# Patient Record
Sex: FEMALE | Race: WHITE | NOT HISPANIC OR LATINO | Employment: UNEMPLOYED | URBAN - METROPOLITAN AREA
[De-identification: names, ages, dates, MRNs, and addresses within clinical notes are randomized per-mention and may not be internally consistent; named-entity substitution may affect disease eponyms.]

---

## 2023-05-16 ENCOUNTER — HOSPITAL ENCOUNTER (EMERGENCY)
Facility: HOSPITAL | Age: 48
Discharge: HOME | End: 2023-05-16
Attending: STUDENT IN AN ORGANIZED HEALTH CARE EDUCATION/TRAINING PROGRAM

## 2023-05-16 VITALS
HEIGHT: 62 IN | OXYGEN SATURATION: 96 % | SYSTOLIC BLOOD PRESSURE: 136 MMHG | BODY MASS INDEX: 20.24 KG/M2 | TEMPERATURE: 98.4 F | HEART RATE: 86 BPM | WEIGHT: 110 LBS | RESPIRATION RATE: 20 BRPM | DIASTOLIC BLOOD PRESSURE: 77 MMHG

## 2023-05-16 DIAGNOSIS — N93.9 VAGINAL BLEEDING: Primary | ICD-10-CM

## 2023-05-16 LAB
ANION GAP SERPL CALCULATED.3IONS-SCNC: 7 MMOL/L (ref 4–13)
BACTERIA UR QL AUTO: ABNORMAL /HPF
BASOPHILS # BLD AUTO: 0.04 THOUSANDS/ÂΜL (ref 0–0.1)
BASOPHILS NFR BLD AUTO: 1 % (ref 0–1)
BILIRUB UR QL STRIP: NEGATIVE
BUN SERPL-MCNC: 19 MG/DL (ref 5–25)
CALCIUM SERPL-MCNC: 9.5 MG/DL (ref 8.4–10.2)
CHLORIDE SERPL-SCNC: 104 MMOL/L (ref 96–108)
CLARITY UR: CLEAR
CO2 SERPL-SCNC: 26 MMOL/L (ref 21–32)
COLOR UR: YELLOW
CREAT SERPL-MCNC: 0.64 MG/DL (ref 0.6–1.3)
EOSINOPHIL # BLD AUTO: 0.13 THOUSAND/ÂΜL (ref 0–0.61)
EOSINOPHIL NFR BLD AUTO: 3 % (ref 0–6)
ERYTHROCYTE [DISTWIDTH] IN BLOOD BY AUTOMATED COUNT: 12.7 % (ref 11.6–15.1)
GFR SERPL CREATININE-BSD FRML MDRD: 105 ML/MIN/1.73SQ M
GLUCOSE SERPL-MCNC: 102 MG/DL (ref 65–140)
GLUCOSE UR STRIP-MCNC: NEGATIVE MG/DL
HCG SERPL QL: NEGATIVE
HCT VFR BLD AUTO: 39.3 % (ref 34.8–46.1)
HGB BLD-MCNC: 12.5 G/DL (ref 11.5–15.4)
HGB UR QL STRIP.AUTO: ABNORMAL
IMM GRANULOCYTES # BLD AUTO: 0.01 THOUSAND/UL (ref 0–0.2)
IMM GRANULOCYTES NFR BLD AUTO: 0 % (ref 0–2)
KETONES UR STRIP-MCNC: NEGATIVE MG/DL
LEUKOCYTE ESTERASE UR QL STRIP: ABNORMAL
LYMPHOCYTES # BLD AUTO: 1.11 THOUSANDS/ÂΜL (ref 0.6–4.47)
LYMPHOCYTES NFR BLD AUTO: 26 % (ref 14–44)
MCH RBC QN AUTO: 30.2 PG (ref 26.8–34.3)
MCHC RBC AUTO-ENTMCNC: 31.8 G/DL (ref 31.4–37.4)
MCV RBC AUTO: 95 FL (ref 82–98)
MONOCYTES # BLD AUTO: 0.54 THOUSAND/ÂΜL (ref 0.17–1.22)
MONOCYTES NFR BLD AUTO: 13 % (ref 4–12)
NEUTROPHILS # BLD AUTO: 2.42 THOUSANDS/ÂΜL (ref 1.85–7.62)
NEUTS SEG NFR BLD AUTO: 57 % (ref 43–75)
NITRITE UR QL STRIP: NEGATIVE
NON-SQ EPI CELLS URNS QL MICRO: ABNORMAL /HPF
NRBC BLD AUTO-RTO: 0 /100 WBCS
PH UR STRIP.AUTO: 5.5 [PH]
PLATELET # BLD AUTO: 248 THOUSANDS/UL (ref 149–390)
PMV BLD AUTO: 11.1 FL (ref 8.9–12.7)
POTASSIUM SERPL-SCNC: 3.5 MMOL/L (ref 3.5–5.3)
PROT UR STRIP-MCNC: NEGATIVE MG/DL
RBC # BLD AUTO: 4.14 MILLION/UL (ref 3.81–5.12)
RBC #/AREA URNS AUTO: ABNORMAL /HPF
SODIUM SERPL-SCNC: 137 MMOL/L (ref 135–147)
SP GR UR STRIP.AUTO: 1.02
TSH SERPL DL<=0.05 MIU/L-ACNC: 1.29 UIU/ML (ref 0.45–4.5)
UROBILINOGEN UR QL STRIP.AUTO: 0.2 E.U./DL
WBC # BLD AUTO: 4.25 THOUSAND/UL (ref 4.31–10.16)
WBC #/AREA URNS AUTO: ABNORMAL /HPF

## 2023-05-16 NOTE — ED PROVIDER NOTES
History  Chief Complaint   Patient presents with   • Vaginal Bleeding     X 2 weeks with back pain     HPI: Is a pleasant 42-year-old female who presents to the emergency department with approximately 2 weeks of vaginal bleeding  Patient states she is currently on her menstrual cycle which normally last 4 days and is regular however the cycle has lasted approximately 16 days of bleeding which is not normal for her  Also reports that approximately 3 days ago she started to have some pain in her lower back which she attributes to the prolonged menstrual cycle  She denies any previous pertinent history regarding her menstrual cycles however does report that she had transient cyst that have since resolved  Reports she is sexually active does not use any form of contraception as she did have a previous tubal ligation  She states she also has a history of lupus and anemia and is concerned that she may be anemic secondary to the heavy menstrual cycles  She denies taking any OTC medications  Denies any history of STDs  None       Past Medical History:   Diagnosis Date   • Hyperparathyroidism ([facility] [address] )    • Lupus ([facility] )        Past Surgical History:   Procedure Laterality Date   • PARATHYROIDECTOMY         History reviewed  No pertinent family history  I have reviewed and agree with the history as documented  E-Cigarette/Vaping   • E-Cigarette Use Never User      E-Cigarette/Vaping Substances     Social History     Tobacco Use   • Smoking status: Never   • Smokeless tobacco: Never   Vaping Use   • Vaping Use: Never used   Substance Use Topics   • Alcohol use: Not Currently   • Drug use: Never       Review of Systems   Constitutional: Negative for activity change, appetite change, chills, fatigue and fever  HENT: Negative for congestion, dental problem, drooling, ear discharge, ear pain, facial swelling, postnasal drip, rhinorrhea and sinus pain  Eyes: Negative for photophobia, pain, discharge and itching  Respiratory: Negative for apnea, cough, chest tightness and shortness of breath  Cardiovascular: Negative for chest pain and leg swelling  Gastrointestinal: Negative for abdominal distention, abdominal pain, anal bleeding, constipation, diarrhea and nausea  Endocrine: Negative for cold intolerance, heat intolerance and polydipsia  Genitourinary: Positive for vaginal bleeding  Negative for difficulty urinating  Musculoskeletal: Positive for back pain  Negative for arthralgias, gait problem, joint swelling and myalgias  Skin: Negative for color change and pallor  Allergic/Immunologic: Negative for immunocompromised state  Neurological: Negative for dizziness, seizures, facial asymmetry, weakness, light-headedness, numbness and headaches  Psychiatric/Behavioral: Negative for agitation, behavioral problems, confusion, decreased concentration and dysphoric mood  All other systems reviewed and are negative  Physical Exam  Physical Exam  Vitals and nursing note reviewed  Constitutional:       General: She is not in acute distress  Appearance: She is well-developed  HENT:      Head: Normocephalic and atraumatic  Eyes:      Conjunctiva/sclera: Conjunctivae normal       Pupils: Pupils are equal, round, and reactive to light  Cardiovascular:      Rate and Rhythm: Normal rate and regular rhythm  Heart sounds: Normal heart sounds  No murmur heard  No friction rub  Pulmonary:      Effort: Pulmonary effort is normal       Breath sounds: Normal breath sounds  Abdominal:      General: Bowel sounds are normal       Palpations: Abdomen is soft  Musculoskeletal:         General: Normal range of motion  Cervical back: Normal range of motion and neck supple  Skin:     General: Skin is warm  Capillary Refill: Capillary refill takes less than 2 seconds  Neurological:      Mental Status: She is alert and oriented to person, place, and time        Motor: No abnormal muscle tone       Coordination: Coordination normal    Psychiatric:         Behavior: Behavior normal          Thought Content: Thought content normal          Vital Signs  ED Triage Vitals [05/16/23 0938]   Temperature Pulse Respirations Blood Pressure SpO2   98 4 °F (36 9 °C) 86 20 136/77 96 %      Temp Source Heart Rate Source Patient Position - Orthostatic VS BP Location FiO2 (%)   Temporal Monitor Sitting Left arm --      Pain Score       3           Vitals:    05/16/23 0938   BP: 136/77   Pulse: 86   Patient Position - Orthostatic VS: Sitting         Visual Acuity      ED Medications  Medications - No data to display    Diagnostic Studies  Results Reviewed     Procedure Component Value Units Date/Time    Urine Microscopic [[id number]]  (Abnormal) Collected: 05/16/23 1011    Lab Status: Final result Specimen: Urine, Clean Catch Updated: 05/16/23 1059     RBC, UA 4-10 /hpf      WBC, UA 10-20 /hpf      Epithelial Cells Occasional /hpf      Bacteria, UA Occasional /hpf     Urine culture [[id number]] Collected: 05/16/23 1011    Lab Status:  In process Specimen: Urine, Clean Catch Updated: 05/16/23 1059    TSH [[id number]]  (Normal) Collected: 05/16/23 1006    Lab Status: Final result Specimen: Blood from Arm, Left Updated: 05/16/23 1048     TSH 3RD GENERATON 1 294 uIU/mL     hCG, qualitative pregnancy [[id number]]  (Normal) Collected: 05/16/23 1006    Lab Status: Final result Specimen: Blood from Arm, Left Updated: 05/16/23 1038     Preg, Serum Negative    Basic metabolic panel [[id number]] Collected: 05/16/23 1006    Lab Status: Final result Specimen: Blood from Arm, Left Updated: 05/16/23 1029     Sodium 137 mmol/L      Potassium 3 5 mmol/L      Chloride 104 mmol/L      CO2 26 mmol/L      ANION GAP 7 mmol/L      BUN 19 mg/dL      Creatinine 0 64 mg/dL      Glucose 102 mg/dL      Calcium 9 5 mg/dL      eGFR 105 ml/min/1 73sq m     Narrative:      [place] guidelines for Chronic Kidney Disease (CKD): •  Stage 1 with normal or high GFR (GFR > 90 mL/min/1 73 square meters)  •  Stage 2 Mild CKD (GFR = 60-89 mL/min/1 73 square meters)  •  Stage 3A Moderate CKD (GFR = 45-59 mL/min/1 73 square meters)  •  Stage 3B Moderate CKD (GFR = 30-44 mL/min/1 73 square meters)  •  Stage 4 Severe CKD (GFR = 15-29 mL/min/1 73 square meters)  •  Stage 5 End Stage CKD (GFR <15 mL/min/1 73 square meters)  Note: GFR calculation is accurate only with a steady state creatinine    UA w Reflex to Microscopic w Reflex to Culture [[id number]]  (Abnormal) Collected: 05/16/23 1011    Lab Status: Final result Specimen: Urine, Clean Catch Updated: 05/16/23 1021     Color, UA Yellow     Clarity, UA Clear     Specific Gravity, UA 1 025     pH, UA 5 5     Leukocytes, UA 1+     Nitrite, UA Negative     Protein, UA Negative mg/dl      Glucose, UA Negative mg/dl      Ketones, UA Negative mg/dl      Urobilinogen, UA 0 2 E U /dl      Bilirubin, UA Negative     Occult Blood, UA Trace-Intact    CBC and differential [[id number]]  (Abnormal) Collected: 05/16/23 1006    Lab Status: Final result Specimen: Blood from Arm, Left Updated: 05/16/23 1012     WBC 4 25 Thousand/uL      RBC 4 14 Million/uL      Hemoglobin 12 5 g/dL      Hematocrit 39 3 %      MCV 95 fL      MCH 30 2 pg      MCHC 31 8 g/dL      RDW 12 7 %      MPV 11 1 fL      Platelets 391 Thousands/uL      nRBC 0 /100 WBCs      Neutrophils Relative 57 %      Immat GRANS % 0 %      Lymphocytes Relative 26 %      Monocytes Relative 13 %      Eosinophils Relative 3 %      Basophils Relative 1 %      Neutrophils Absolute 2 42 Thousands/µL      Immature Grans Absolute 0 01 Thousand/uL      Lymphocytes Absolute 1 11 Thousands/µL      Monocytes Absolute 0 54 Thousand/µL      Eosinophils Absolute 0 13 Thousand/µL      Basophils Absolute 0 04 Thousands/µL                  No orders to display              Procedures  Procedures         ED Course  ED Course as of 05/16/23 [address] [place] [address] May 16, 2023   1004 Exam performed chaperone present Lydia DE LEÓN present  Normal-appearing pelvic examination physiological discharge from the cervix however no obvious laceration abrasion or other abnormal findings  Cervical os was dilated corresponding with history of pregnancy  1014 WBC(!): 4 25   1014 Hemoglobin: 12 5   1023 Blood, UA(!): Trace-Intact   1023 Leukocytes, UA(!): 1+                               SBIRT 22yo+    Flowsheet Row Most Recent Value   Initial Alcohol Screen: US AUDIT-C     1  How often do you have a drink containing alcohol? 0 Filed at: 05/16/2023 1010   2  How many drinks containing alcohol do you have on a typical day you are drinking? 0 Filed at: 05/16/2023 1010   3a  Male UNDER 65: How often do you have five or more drinks on one occasion? 0 Filed at: 05/16/2023 1010   3b  FEMALE Any Age, or MALE 65+: How often do you have 4 or more drinks on one occassion? 0 Filed at: 05/16/2023 1010   Audit-C Score 0 Filed at: 05/16/2023 1010   REGULO: How many times in the past year have you    Used an illegal drug or used a prescription medication for non-medical reasons? Never Filed at: 05/16/2023 1010                    Medical Decision Making  Pleasant 44-year-old female presents with prolonged vaginal bleeding in the setting of her menstrual cycle  Physical exam she is hemodynamically stable and overall well-appearing  We will proceed with basic lab work to rule out anemia, pregnancy, underlying infection  Depending on work-up patient may benefit from ultrasound  Discussed with patient and she consented to pelvic examination  Anticipate discharge home with OB follow-up  Amount and/or Complexity of Data Reviewed  Independent Historian: parent  External Data Reviewed: labs, radiology, ECG and notes  Labs: ordered  Decision-making details documented in ED Course  Radiology: independent interpretation performed  Decision-making details documented in ED Course    ECG/medicine tests: independent interpretation performed  Decision-making details documented in ED Course  Disposition  Final diagnoses:   Vaginal bleeding     Time reflects when diagnosis was documented in both MDM as applicable and the Disposition within this note     Time User Action Codes Description Comment    5/16/2023 10:32 AM Toma Troncoso Add [N93 9] Vaginal bleeding       ED Disposition     ED Disposition   Discharge    Condition   Stable    Date/Time   Tue May 16, 2023 11:02 AM    Comment   Ashley Cardona discharge to home/self care  Follow-up Information     Follow up With Specialties Details Why Contact Info Additional Information    [address] [place] [address] [facility]   [address] [address] [address], [address],           Patient's Medications    No medications on file       No discharge procedures on file      PDMP Review     None          ED Provider  Electronically Signed by           Jason Winn MD  05/16/23 5132

## 2023-05-17 LAB — BACTERIA UR CULT: NORMAL

## 2023-06-07 ENCOUNTER — APPOINTMENT (OUTPATIENT)
Dept: RADIOLOGY | Facility: CLINIC | Age: 48
End: 2023-06-07
Payer: COMMERCIAL

## 2023-06-07 ENCOUNTER — OFFICE VISIT (OUTPATIENT)
Dept: FAMILY MEDICINE CLINIC | Facility: CLINIC | Age: 48
End: 2023-06-07
Payer: COMMERCIAL

## 2023-06-07 ENCOUNTER — APPOINTMENT (OUTPATIENT)
Dept: LAB | Facility: CLINIC | Age: 48
End: 2023-06-07
Payer: COMMERCIAL

## 2023-06-07 VITALS
HEIGHT: 62 IN | DIASTOLIC BLOOD PRESSURE: 78 MMHG | TEMPERATURE: 98.3 F | OXYGEN SATURATION: 98 % | HEART RATE: 75 BPM | WEIGHT: 115 LBS | BODY MASS INDEX: 21.16 KG/M2 | SYSTOLIC BLOOD PRESSURE: 112 MMHG

## 2023-06-07 DIAGNOSIS — K31.A0 GASTRIC INTESTINAL METAPLASIA: Primary | ICD-10-CM

## 2023-06-07 DIAGNOSIS — M54.2 NECK PAIN ON LEFT SIDE: ICD-10-CM

## 2023-06-07 DIAGNOSIS — Z13.220 SCREENING FOR CHOLESTEROL LEVEL: ICD-10-CM

## 2023-06-07 DIAGNOSIS — Z12.11 SCREENING FOR COLON CANCER: ICD-10-CM

## 2023-06-07 DIAGNOSIS — Z12.4 SCREENING FOR CERVICAL CANCER: ICD-10-CM

## 2023-06-07 DIAGNOSIS — R29.898 DECREASED ROM OF NECK: ICD-10-CM

## 2023-06-07 DIAGNOSIS — M32.9 LUPUS (HCC): ICD-10-CM

## 2023-06-07 DIAGNOSIS — Z12.31 ENCOUNTER FOR SCREENING MAMMOGRAM FOR MALIGNANT NEOPLASM OF BREAST: ICD-10-CM

## 2023-06-07 DIAGNOSIS — Z86.010 HISTORY OF COLON POLYPS: ICD-10-CM

## 2023-06-07 PROBLEM — E21.3 HYPERPARATHYROIDISM (HCC): Status: ACTIVE | Noted: 2019-09-27

## 2023-06-07 PROBLEM — IMO0002 LUPUS: Status: ACTIVE | Noted: 2019-09-18

## 2023-06-07 PROBLEM — K64.9 BLEEDING HEMORRHOID: Status: ACTIVE | Noted: 2021-05-19

## 2023-06-07 PROBLEM — E21.3 HYPERPARATHYROIDISM (HCC): Status: RESOLVED | Noted: 2019-09-27 | Resolved: 2023-06-07

## 2023-06-07 PROBLEM — D50.9 IRON DEFICIENCY ANEMIA: Status: ACTIVE | Noted: 2018-08-29

## 2023-06-07 LAB
CHOLEST SERPL-MCNC: 187 MG/DL
HDLC SERPL-MCNC: 78 MG/DL
LDLC SERPL CALC-MCNC: 97 MG/DL (ref 0–100)
NONHDLC SERPL-MCNC: 109 MG/DL
TRIGL SERPL-MCNC: 58 MG/DL

## 2023-06-07 PROCEDURE — 80061 LIPID PANEL: CPT

## 2023-06-07 PROCEDURE — 72050 X-RAY EXAM NECK SPINE 4/5VWS: CPT

## 2023-06-07 PROCEDURE — 99204 OFFICE O/P NEW MOD 45 MIN: CPT | Performed by: NURSE PRACTITIONER

## 2023-06-07 PROCEDURE — 36415 COLL VENOUS BLD VENIPUNCTURE: CPT

## 2023-06-07 NOTE — PROGRESS NOTES
[facility]        NAME: William Murillo is a 50 y o  female  : 1975    MRN: [id number]  DATE: 2023  TIME: 10:09 AM    Assessment and Plan   Gastric intestinal metaplasia [K31  A0]  1  Gastric intestinal metaplasia  Ambulatory Referral to Gastroenterology      2  Screening for colon cancer        3  Encounter for screening mammogram for malignant neoplasm of breast  Mammo screening bilateral w 3d & cad      4  Screening for cervical cancer  Ambulatory Referral to Obstetrics / Gynecology      5  History of colon polyps  Ambulatory Referral to Gastroenterology      6  Neck pain on left side  XR spine cervical complete 4 or 5 vw non injury    Ambulatory Referral to Physical Therapy      7  Decreased ROM of neck  XR spine cervical complete 4 or 5 vw non injury    Ambulatory Referral to Physical Therapy      8  Screening for cholesterol level  Lipid panel    Lipid panel        1  Screening for colon cancer      2  Encounter for screening mammogram for malignant neoplasm of breast    - Mammo screening bilateral w 3d & cad; Future    3  Screening for cervical cancer    - Ambulatory Referral to Obstetrics / Gynecology; Future    4  History of colon polyps  Family history of colon cancer in paternal uncles  And has had polyps with colonoscopy in the past   - Ambulatory Referral to Gastroenterology; Future    5  Gastric intestinal metaplasia  Has had EGD in the past  Food will get stuck if eating too fast    - Ambulatory Referral to Gastroenterology; Future    6  Neck pain on left side    - XR spine cervical complete 4 or 5 vw non injury; Future  - Ambulatory Referral to Physical Therapy; Future    7  Decreased ROM of neck    - XR spine cervical complete 4 or 5 vw non injury; Future  - Ambulatory Referral to Physical Therapy; Future    8  Screening for cholesterol level    - Lipid panel;  Future  - Lipid panel      Patient Instructions     There are no Patient Instructions on file for this visit         Chief Complaint     Chief Complaint   Patient presents with   • Establish Care     Patient would like to talk about an evaluation for Parkinson's  • Annual Exam         History of Present Illness        Patient here for new patient visit to establish care  Moved from [place] 2 years ago  Lupus- hydroxychloroquine for awhile but was not helping with symptoms  Will get rash and joint pain when flared up, usually when eating meat and stress  Was strictly vegan for 7 years, will eat some fish and chicken now  Hyperparathyroidism- was having memory issues, had palpitations  Had parathyroidectomy and symptoms have since resolved  Concern for parkinson's disease  Reports that she is very stiff in her neck and weakness  Taste is off, cant take things unless very strong  Reports that everything is vibrating when waking up, feels like she is sitting on a dryer  Reports she has not spoken to family in over 21 years  Reports when she is walking her left arm does not swing when walking  Reports that she cant wisk with left hand and having trouble lifting things up with left arm, needs support from the right  Denies any injury or trauma  Does get some pain in left shoulder and neck  Review of Systems   Review of Systems   Constitutional: Negative  Negative for fatigue and fever  Respiratory: Negative  Negative for shortness of breath and wheezing  Skin: Negative  Negative for rash  Neurological: Negative  Negative for dizziness, light-headedness and headaches  Psychiatric/Behavioral: Negative  Negative for sleep disturbance  The patient is not nervous/anxious  PHQ-2/9 Depression Screening    Little interest or pleasure in doing things: 0 - not at all  Feeling down, depressed, or hopeless: 0 - not at all  PHQ-2 Score: 0  PHQ-2 Interpretation: Negative depression screen        Current Medications     No current outpatient medications on file      Current Allergies "    Allergies as of 06/07/2023 - Reviewed 06/07/2023   Allergen Reaction Noted   • Penicillin g Anaphylaxis 09/18/2019   • Penicillins Anaphylaxis 05/16/2023   • Sulfamethoxazole Fever 09/18/2019   • Ciprofloxacin Itching and Anxiety 09/18/2019   • Sulfa antibiotics Rash 05/16/2023            The following portions of the patient's history were reviewed and updated as appropriate: allergies, current medications, past family history, past medical history, past social history, past surgical history and problem list      Past Medical History:   Diagnosis Date   • Hyperparathyroidism ([facility] )    • Lupus ([facility] [address] )        Past Surgical History:   Procedure Laterality Date   • PARATHYROIDECTOMY      2020       Family History   Problem Relation Age of Onset   • Multiple sclerosis Mother          Medications have been verified  Objective   /78   Pulse 75   Temp 98 3 °F (36 8 °C)   Ht 5' 2\" (1 575 m)   Wt 52 2 kg (115 lb)   SpO2 98%   BMI 21 03 kg/m²        Physical Exam     Physical Exam  Vitals and nursing note reviewed  Constitutional:       General: She is not in acute distress  Appearance: Normal appearance  She is well-developed  She is not ill-appearing  HENT:      Head: Normocephalic and atraumatic  Eyes:      General: Lids are normal    Neck:      Comments: Limited rom of neck to the left  Cardiovascular:      Rate and Rhythm: Normal rate and regular rhythm  Heart sounds: Normal heart sounds, S1 normal and S2 normal    Pulmonary:      Effort: Pulmonary effort is normal  No respiratory distress  Breath sounds: Normal breath sounds  No decreased breath sounds  Musculoskeletal:         General: No tenderness or deformity  Left shoulder: No swelling, deformity, effusion, tenderness or bony tenderness  Decreased range of motion  Decreased strength  Cervical back: Torticollis present  No edema or erythema  Muscular tenderness (mild) present   No pain with movement or spinous " process tenderness  Decreased range of motion  Lymphadenopathy:      Cervical: No cervical adenopathy  Skin:     General: Skin is warm  Findings: No erythema or rash  Neurological:      Mental Status: She is alert and oriented to person, place, and time  Psychiatric:         Mood and Affect: Mood normal          Behavior: Behavior normal  Behavior is cooperative  Thought Content:  Thought content normal

## 2023-06-13 ENCOUNTER — OFFICE VISIT (OUTPATIENT)
Dept: GASTROENTEROLOGY | Facility: CLINIC | Age: 48
End: 2023-06-13
Payer: COMMERCIAL

## 2023-06-13 VITALS
DIASTOLIC BLOOD PRESSURE: 68 MMHG | HEART RATE: 68 BPM | WEIGHT: 114 LBS | HEIGHT: 62 IN | BODY MASS INDEX: 20.98 KG/M2 | SYSTOLIC BLOOD PRESSURE: 108 MMHG | OXYGEN SATURATION: 98 % | RESPIRATION RATE: 18 BRPM

## 2023-06-13 DIAGNOSIS — Z87.19 HISTORY OF RECTAL BLEEDING: ICD-10-CM

## 2023-06-13 DIAGNOSIS — Z86.010 PERSONAL HISTORY OF COLONIC POLYPS: ICD-10-CM

## 2023-06-13 DIAGNOSIS — R13.10 DYSPHAGIA, UNSPECIFIED TYPE: ICD-10-CM

## 2023-06-13 DIAGNOSIS — K21.9 GASTROESOPHAGEAL REFLUX DISEASE, UNSPECIFIED WHETHER ESOPHAGITIS PRESENT: Primary | ICD-10-CM

## 2023-06-13 DIAGNOSIS — Z83.79 FAMILY HISTORY OF CELIAC DISEASE: ICD-10-CM

## 2023-06-13 DIAGNOSIS — K31.A0 GASTRIC INTESTINAL METAPLASIA: ICD-10-CM

## 2023-06-13 DIAGNOSIS — Z80.0 FAMILY HISTORY OF COLON CANCER: ICD-10-CM

## 2023-06-13 PROCEDURE — 99244 OFF/OP CNSLTJ NEW/EST MOD 40: CPT | Performed by: FAMILY MEDICINE

## 2023-06-13 RX ORDER — FAMOTIDINE 40 MG/1
40 TABLET, FILM COATED ORAL
Qty: 30 TABLET | Refills: 11 | Status: SHIPPED | OUTPATIENT
Start: 2023-06-13

## 2023-06-13 NOTE — PROGRESS NOTES
[facility] - Outpatient Consultation  Kathryn Dunne 50 y o  female MRN: [id number]  Encounter: [id number]          ASSESSMENT AND PLAN:      1  Family history of colon cancer  2  Personal history of colonic polyps  3  History of rectal bleeding  Patient with a longstanding history of intermittent rectal bleeding attributed to large known external hemorrhoids and ?rectal prolapse, without recurrence of rectal bleeding in the last 1 5 years  Additionally, reports at least 2 previous colonoscopies both with polyps in addition to an extensive family history of colorectal cancer (father dx <55 y/o and paternal aunt and uncle)  Given the lack of previous documentation and pathology reports from her prior colonoscopies, recommend repeat colonoscopy for surveillance of polyps  Additionally, recommended referral to genetics given her extensive family history of colorectal cancer  Patient is aware to promptly inform provider for recurrence of rectal bleeding  If colonoscopy is unremarkable, would consider a referral to colorectal surgery for management of known hemorrhoids  - Ambulatory Referral to Gastroenterology  - polyethylene glycol (GOLYTELY) 4000 mL solution; Take 4,000 mL by mouth once for 1 dose  Dispense: 4000 mL; Refill: 0  - Colonoscopy; Future  - Ambulatory Referral to Genetics; Future    4  Gastroesophageal reflux disease, unspecified whether esophagitis present  5  Dysphagia, unspecified type  6  Gastric intestinal metaplasia  Patient with a longstanding history of GERD and esophageal stricture requiring dilatation, now experiencing almost daily reflux and progressively worsening dysphagia to solids x2 years  Has previously taking both omeprazole and pantoprazole with gastroparesis-like side effects  Also reports +gastrointestinal metaplasia on previous biopsy       Discussed broad differential for symptoms, particularly peptic stricture, esophagitis/gastritis, PUD, or EoE  Recommended EGD with possible esophageal and gastric biopsies, in addition to her colonoscopy, for further evaluation  Additionally, discussed she may require biopsies in mapping fashion for surveillance of reported gastrointestinal metaplasia  Given previous intolerance to PPIs, precsirbed famotidine 40 mg qHS  Discussed dietary and lifestyle modifications to help prevent reflux      - Ambulatory Referral to Gastroenterology  - famotidine (PEPCID) 40 MG tablet; Take 1 tablet (40 mg total) by mouth daily at bedtime  Dispense: 30 tablet; Refill: 11  - EGD; Future    7  Family history of celiac disease  Patient reports a family history of celiac disease  Given upper GI symptoms, will check celiac serologies for completeness  - Celiac Disease Antibody Profile; Future      Discussed risks of procedure with patient including, but not limited to, bleeding, infection, and perforation; Patient gave verbal understanding and is agreeable to proceed  Discussed and given instructions for EGD and bowel prep as ordered - Golytely/Dulcolax  Follow-up approx 2 weeks after procedures to discuss results  ______________________________________________________________________    HPI: Patient is a 50 y o  female with PMH significant for a history of NANDA and lupus who presents today for consultation regarding a potential EGD and colonscopy  Patient has a history of daily rectal bleeding presumed to be secondary to large known external hemorrhoids and ?rectal prolapse for which she has followed with multiple gastroenterologists, most recently seen by digestive health services in [facility] she has not had an issue with rectal bleeding for the past year and a half  Believes her last colonoscopy to be in 3/2021, for which documentation and pathology unavailable for review    States she has had at least 2 previous colonoscopies both with polyps, but unsure if they were precancerous  Reports an extensive family history of colon cancer including her father (dx <53 y/o) in addition to her paternal aunt and uncle  Also reports a family history of celiac disease, including her paternal uncle  She also reports having had a previous upper endoscopy notable for intestinal metaplasia  Again, documentation and pathology unavailable for review  Denies a family history of gastric or esophageal cancer  Denies excessive NSAID use, EtOH use or tobacco use  She also has a history of dysphagia, requiring 1 previous dilatation (6674-1173), reports progressively worsening dysphagia to solids and occasionally to liquids over the last 2 years  Also reports almost daily reflux  States she has taken both omeprazole and pantoprazole prior, but gets a sensation of gastroparesis with these medications  REVIEW OF SYSTEMS:    CONSTITUTIONAL: Denies any fever, chills, rigors, and weight loss  HEENT: No earache or tinnitus  Denies hearing loss or visual disturbances  CARDIOVASCULAR: No chest pain or palpitations  RESPIRATORY: Denies any cough, hemoptysis, shortness of breath or dyspnea on exertion  GASTROINTESTINAL: As noted in the History of Present Illness  GENITOURINARY: No problems with urination  Denies any hematuria or dysuria  NEUROLOGIC: No dizziness or vertigo, denies headaches  MUSCULOSKELETAL: Denies any muscle or joint pain  SKIN: Denies skin rashes or itching  ENDOCRINE: Denies excessive thirst  Denies intolerance to heat or cold  PSYCHOSOCIAL: Denies depression or anxiety  Denies any recent memory loss         Historical Information   Past Medical History:   Diagnosis Date   • [facility]    • Lupus ([facility] )      Past Surgical History:   Procedure Laterality Date   • PARATHYROIDECTOMY      2020     Social History   Social History     Substance and Sexual Activity   Alcohol Use Not Currently     Social History     Substance and Sexual Activity   Drug Use Never "    Social History     Tobacco Use   Smoking Status Never   Smokeless Tobacco Never     Family History   Problem Relation Age of Onset   • Multiple sclerosis Mother        Meds/Allergies       Current Outpatient Medications:   •  famotidine (PEPCID) 40 MG tablet  •  polyethylene glycol (GOLYTELY) 4000 mL solution    Allergies   Allergen Reactions   • Penicillin G Anaphylaxis   • Penicillins Anaphylaxis   • Sulfamethoxazole Fever     itching   • Ciprofloxacin Itching and Anxiety   • Sulfa Antibiotics Rash           Objective     Blood pressure 108/68, pulse 68, resp  rate 18, height 5' 2\" (1 575 m), weight 51 7 kg (114 lb), SpO2 98 %  Body mass index is 20 85 kg/m²  PHYSICAL EXAM:      General Appearance:   Alert, cooperative, no distress   HEENT:   Normocephalic, atraumatic, anicteric  Neck:  Supple, symmetrical, trachea midline   Lungs:   Clear to auscultation bilaterally; no rales, rhonchi or wheezing; respirations unlabored    Heart[de-identified]   Regular rate and rhythm; no murmur, rub, or gallop  Abdomen:   Soft, non-tender, non-distended; normal bowel sounds; no masses, no organomegaly    Genitalia:   Deferred    Rectal:   Deferred    Extremities:  No cyanosis, clubbing or edema    Pulses:  2+ and symmetric    Skin:  No jaundice, rashes, or lesions    Lymph nodes:  No palpable cervical lymphadenopathy        Lab Results:   No visits with results within 1 Day(s) from this visit  Latest known visit with results is:   Appointment on 06/07/2023   Component Date Value   • Cholesterol 06/07/2023 187    • Triglycerides 06/07/2023 58    • HDL, Direct 06/07/2023 78    • LDL Calculated 06/07/2023 97    • Non-HDL-Chol (CHOL-HDL) 06/07/2023 109          Radiology Results:   XR spine cervical complete 4 or 5 vw non injury    Result Date: 6/11/2023  Narrative: CERVICAL SPINE INDICATION:   M54 2: Cervicalgia R29 898: Other symptoms and signs involving the musculoskeletal system   COMPARISON:  None VIEWS:  XR SPINE " CERVICAL COMPLETE 4 OR 5 VW NON INJURY FINDINGS: No fracture  Normal alignment without subluxation  There are endplate and facet joint degenerative changes throughout the cervical spine most pronounced at C5-6 and C6-7  The neuroforamina are patent  The prevertebral soft tissues are within normal limits  The lung apices are clear  Impression: No acute osseous abnormality  Degenerative changes as above  Workstation performed: [id number]       Roberto Carlos SpeakerLISETH     **Please note:  Dictation voice to text software may have been used in the creation of this record  Occasional wrong word or “sound alike” substitutions may have occurred due to the inherent limitations of voice recognition software  Read the chart carefully and recognize, using context, where substitutions have occurred  **

## 2023-06-13 NOTE — H&P (VIEW-ONLY)
[facility] - Outpatient Consultation  Alexandrea Hoff 50 y o  female MRN: [id number]  Encounter: [id number]          ASSESSMENT AND PLAN:      1  Family history of colon cancer  2  Personal history of colonic polyps  3  History of rectal bleeding  Patient with a longstanding history of intermittent rectal bleeding attributed to large known external hemorrhoids and ?rectal prolapse, without recurrence of rectal bleeding in the last 1 5 years  Additionally, reports at least 2 previous colonoscopies both with polyps in addition to an extensive family history of colorectal cancer (father dx <55 y/o and paternal aunt and uncle)  Given the lack of previous documentation and pathology reports from her prior colonoscopies, recommend repeat colonoscopy for surveillance of polyps  Additionally, recommended referral to genetics given her extensive family history of colorectal cancer  Patient is aware to promptly inform provider for recurrence of rectal bleeding  If colonoscopy is unremarkable, would consider a referral to colorectal surgery for management of known hemorrhoids  - Ambulatory Referral to Gastroenterology  - polyethylene glycol (GOLYTELY) 4000 mL solution; Take 4,000 mL by mouth once for 1 dose  Dispense: 4000 mL; Refill: 0  - Colonoscopy; Future  - Ambulatory Referral to Genetics; Future    4  Gastroesophageal reflux disease, unspecified whether esophagitis present  5  Dysphagia, unspecified type  6  Gastric intestinal metaplasia  Patient with a longstanding history of GERD and esophageal stricture requiring dilatation, now experiencing almost daily reflux and progressively worsening dysphagia to solids x2 years  Has previously taking both omeprazole and pantoprazole with gastroparesis-like side effects  Also reports +gastrointestinal metaplasia on previous biopsy       Discussed broad differential for symptoms, particularly peptic stricture, esophagitis/gastritis, PUD, or EoE  Recommended EGD with possible esophageal and gastric biopsies, in addition to her colonoscopy, for further evaluation  Additionally, discussed she may require biopsies in mapping fashion for surveillance of reported gastrointestinal metaplasia  Given previous intolerance to PPIs, precsirbed famotidine 40 mg qHS  Discussed dietary and lifestyle modifications to help prevent reflux      - Ambulatory Referral to Gastroenterology  - famotidine (PEPCID) 40 MG tablet; Take 1 tablet (40 mg total) by mouth daily at bedtime  Dispense: 30 tablet; Refill: 11  - EGD; Future    7  Family history of celiac disease  Patient reports a family history of celiac disease  Given upper GI symptoms, will check celiac serologies for completeness  - Celiac Disease Antibody Profile; Future      Discussed risks of procedure with patient including, but not limited to, bleeding, infection, and perforation; Patient gave verbal understanding and is agreeable to proceed  Discussed and given instructions for EGD and bowel prep as ordered - Golytely/Dulcolax  Follow-up approx 2 weeks after procedures to discuss results  ______________________________________________________________________    HPI: Patient is a 50 y o  female with PMH significant for a history of NANDA and lupus who presents today for consultation regarding a potential EGD and colonscopy  Patient has a history of daily rectal bleeding presumed to be secondary to large known external hemorrhoids and ?rectal prolapse for which she has followed with multiple gastroenterologists, most recently seen by digestive health services in [facility] she has not had an issue with rectal bleeding for the past year and a half  Believes her last colonoscopy to be in 3/2021, for which documentation and pathology unavailable for review    States she has had at least 2 previous colonoscopies both with polyps, but unsure if they were precancerous  Reports an extensive family history of colon cancer including her father (dx <55 y/o) in addition to her paternal aunt and uncle  Also reports a family history of celiac disease, including her paternal uncle  She also reports having had a previous upper endoscopy notable for intestinal metaplasia  Again, documentation and pathology unavailable for review  Denies a family history of gastric or esophageal cancer  Denies excessive NSAID use, EtOH use or tobacco use  She also has a history of dysphagia, requiring 1 previous dilatation ([id number]), reports progressively worsening dysphagia to solids and occasionally to liquids over the last 2 years  Also reports almost daily reflux  States she has taken both omeprazole and pantoprazole prior, but gets a sensation of gastroparesis with these medications  REVIEW OF SYSTEMS:    CONSTITUTIONAL: Denies any fever, chills, rigors, and weight loss  HEENT: No earache or tinnitus  Denies hearing loss or visual disturbances  CARDIOVASCULAR: No chest pain or palpitations  RESPIRATORY: Denies any cough, hemoptysis, shortness of breath or dyspnea on exertion  GASTROINTESTINAL: As noted in the History of Present Illness  GENITOURINARY: No problems with urination  Denies any hematuria or dysuria  NEUROLOGIC: No dizziness or vertigo, denies headaches  MUSCULOSKELETAL: Denies any muscle or joint pain  SKIN: Denies skin rashes or itching  ENDOCRINE: Denies excessive thirst  Denies intolerance to heat or cold  PSYCHOSOCIAL: Denies depression or anxiety  Denies any recent memory loss         Historical Information   Past Medical History:   Diagnosis Date   • [facility]    • Lup[facility] ([facility] )      Past Surgical History:   Procedure Laterality Date   • PARATHYROIDECTOMY      2020     Social History   Social History     Substance and Sexual Activity   Alcohol Use Not Currently     Social History     Substance and Sexual Activity   Drug Use Never "    Social History     Tobacco Use   Smoking Status Never   Smokeless Tobacco Never     Family History   Problem Relation Age of Onset   • Multiple sclerosis Mother        Meds/Allergies       Current Outpatient Medications:   •  famotidine (PEPCID) 40 MG tablet  •  polyethylene glycol (GOLYTELY) 4000 mL solution    Allergies   Allergen Reactions   • Penicillin G Anaphylaxis   • Penicillins Anaphylaxis   • Sulfamethoxazole Fever     itching   • Ciprofloxacin Itching and Anxiety   • Sulfa Antibiotics Rash           Objective     Blood pressure 108/68, pulse 68, resp  rate 18, height 5' 2\" (1 575 m), weight 51 7 kg (114 lb), SpO2 98 %  Body mass index is 20 85 kg/m²  PHYSICAL EXAM:      General Appearance:   Alert, cooperative, no distress   HEENT:   Normocephalic, atraumatic, anicteric  Neck:  Supple, symmetrical, trachea midline   Lungs:   Clear to auscultation bilaterally; no rales, rhonchi or wheezing; respirations unlabored    Heart[de-identified]   Regular rate and rhythm; no murmur, rub, or gallop  Abdomen:   Soft, non-tender, non-distended; normal bowel sounds; no masses, no organomegaly    Genitalia:   Deferred    Rectal:   Deferred    Extremities:  No cyanosis, clubbing or edema    Pulses:  2+ and symmetric    Skin:  No jaundice, rashes, or lesions    Lymph nodes:  No palpable cervical lymphadenopathy        Lab Results:   No visits with results within 1 Day(s) from this visit  Latest known visit with results is:   Appointment on 06/07/2023   Component Date Value   • Cholesterol 06/07/2023 187    • Triglycerides 06/07/2023 58    • HDL, Direct 06/07/2023 78    • LDL Calculated 06/07/2023 97    • Non-HDL-Chol (CHOL-HDL) 06/07/2023 109          Radiology Results:   XR spine cervical complete 4 or 5 vw non injury    Result Date: 6/11/2023  Narrative: CERVICAL SPINE INDICATION:   M54 2: Cervicalgia R29 898: Other symptoms and signs involving the musculoskeletal system   COMPARISON:  None VIEWS:  XR SPINE " CERVICAL COMPLETE 4 OR 5 VW NON INJURY FINDINGS: No fracture  Normal alignment without subluxation  There are endplate and facet joint degenerative changes throughout the cervical spine most pronounced at C5-6 and C6-7  The neuroforamina are patent  The prevertebral soft tissues are within normal limits  The lung apices are clear  Impression: No acute osseous abnormality  Degenerative changes as above  Workstation performed: [id number]       Christopher Lynn PA-C     **Please note:  Dictation voice to text software may have been used in the creation of this record  Occasional wrong word or “sound alike” substitutions may have occurred due to the inherent limitations of voice recognition software  Read the chart carefully and recognize, using context, where substitutions have occurred  **

## 2023-06-14 ENCOUNTER — APPOINTMENT (OUTPATIENT)
Dept: LAB | Facility: CLINIC | Age: 48
End: 2023-06-14
Payer: COMMERCIAL

## 2023-06-14 DIAGNOSIS — Z83.79 FAMILY HISTORY OF CELIAC DISEASE: ICD-10-CM

## 2023-06-14 PROCEDURE — 86364 TISS TRNSGLTMNASE EA IG CLAS: CPT

## 2023-06-14 PROCEDURE — 36415 COLL VENOUS BLD VENIPUNCTURE: CPT

## 2023-06-14 PROCEDURE — 86258 DGP ANTIBODY EACH IG CLASS: CPT

## 2023-06-14 PROCEDURE — 86231 EMA EACH IG CLASS: CPT

## 2023-06-14 PROCEDURE — 82784 ASSAY IGA/IGD/IGG/IGM EACH: CPT

## 2023-06-15 ENCOUNTER — TELEPHONE (OUTPATIENT)
Dept: HEMATOLOGY ONCOLOGY | Facility: CLINIC | Age: 48
End: 2023-06-15

## 2023-06-15 LAB
ENDOMYSIUM IGA SER QL: NEGATIVE
GLIADIN PEPTIDE IGA SER-ACNC: 5 UNITS (ref 0–19)
GLIADIN PEPTIDE IGG SER-ACNC: 2 UNITS (ref 0–19)
IGA SERPL-MCNC: 149 MG/DL (ref 87–352)
TTG IGA SER-ACNC: <2 U/ML (ref 0–3)
TTG IGG SER-ACNC: 11 U/ML (ref 0–5)

## 2023-06-15 NOTE — TELEPHONE ENCOUNTER
I called Willy Joel in response to a referral that was received for patient to establish care with [facility]  Outreach was made to schedule a consultation       I left a voicemail explaining the reason for my call and advised patient to call [facility] at [phone]  The referral has been closed

## 2023-06-16 ENCOUNTER — TELEPHONE (OUTPATIENT)
Dept: HEMATOLOGY ONCOLOGY | Facility: CLINIC | Age: 48
End: 2023-06-16

## 2023-06-16 NOTE — TELEPHONE ENCOUNTER
I spoke with Rosario to schedule their consultation with [facility]  Scheduling Outcome: Patient is scheduled for an appointment on 12/06/2023 at 10:30AM with Cathi Nash     Personal/Family History Related to Appointment:     Personal History of Cancer: Patient reports no personal history of cancer    Family History of Cancer: Patient reports family history of leukemia- [place] grandmother  Colon ca- father, [place] aunt, and 2 different [place] uncles  Is patient of [address] [place], [place] [address]    History of Genetic Testing:  Personal History of Genetic Testing: Patient report no personal history of Genetic Testing  Family History of Genetic Testing: Patient reports that no family members have had Genetic Testing  Progeny:  Is patient able to complete our family history questionnaire on a computer?:  Yes    Patient's preferred e-mail address: [email or web address]

## 2023-06-16 NOTE — TELEPHONE ENCOUNTER
[facility]   Patient Name  (First and Last) Colton Escamilla    Patient Date of Birth 1975   Patient's e-mail address [email or web address]    Appointment date and time 12/06/2023 @10:30AM    Was patient advised that this questionnaire must be completed on a computer?  Yes

## 2023-06-20 ENCOUNTER — EVALUATION (OUTPATIENT)
Dept: PHYSICAL THERAPY | Facility: CLINIC | Age: 48
End: 2023-06-20
Payer: COMMERCIAL

## 2023-06-20 DIAGNOSIS — R29.898 DECREASED ROM OF NECK: ICD-10-CM

## 2023-06-20 DIAGNOSIS — M54.2 NECK PAIN ON LEFT SIDE: Primary | ICD-10-CM

## 2023-06-20 PROCEDURE — 97112 NEUROMUSCULAR REEDUCATION: CPT | Performed by: PHYSICAL THERAPIST

## 2023-06-20 PROCEDURE — 97162 PT EVAL MOD COMPLEX 30 MIN: CPT | Performed by: PHYSICAL THERAPIST

## 2023-06-20 NOTE — PROGRESS NOTES
PT Evaluation     Today's date: 2023  Patient name: Lyudmila Modi  : 1975  MRN: [id number]  Referring provider: NALINI Sweet  Dx:   Encounter Diagnosis     ICD-10-CM    1  Neck pain on left side  M54 2 Ambulatory Referral to Physical Therapy      2  Decreased ROM of neck  R29 898 Ambulatory Referral to Physical Therapy          Start Time: 1430  Stop Time: 1520  Total time in clinic (min): 50 minutes    Assessment  Assessment details:   Lyudmila Modi was seen for an initial PT evaluation today  Patient is a 50 y o  female with diagnosis of neck pain and past medical history significant for lupus, parathyroidectomy  Moderate complexity evaluation  due to number of participation restrictions, functional outcome measure of 52% limitation, and evolving clinical presentation  Findings today show limitation in cervical range of motion, lower cervical mobility, scapular weakness and instability, limited deep neck flexor endurance and pain impacting overall functional mobility including ability to turn head, lift, carry  Skilled PT indicated to treat at this time to address above stated deficits and return patient to PLOF  Impairments: abnormal muscle tone, abnormal or restricted ROM, activity intolerance, impaired physical strength, lacks appropriate home exercise program, pain with function, scapular dyskinesis, poor posture  and poor body mechanics  Functional limitations: lifitng, twisting, carrying, reaching  Goals  STG (6 weeks)  1  Patient will display good seated posture with decreased forward head and retracted shoulders for 2 consecutive sessions with 50% VC    2  Patient will have 0/10 cervical pain at rest    3  Improve cervical rotation by 25% for improved ability to drive  LTG (12 weeks)  1  Improved FOTO score by 10% for improved improved overall function  2  Patient will be able to wake with 0/10 pain for return to PLOF     3  Patient will be independent with home exercise program for continued maintenance post PT DC  Plan  Plan details: Progress note in 4 weeks  Patient would benefit from: skilled physical therapy  Planned modality interventions: unattended electrical stimulation, thermotherapy: hydrocollator packs and cryotherapy  Planned therapy interventions: manual therapy, neuromuscular re-education, therapeutic activities, therapeutic exercise, self care and home exercise program  Frequency: 2x week  Duration in weeks: 12  Plan of Care beginning date: 2023  Plan of Care expiration date: 2023  Treatment plan discussed with: patient and PTA        Subjective Evaluation    History of Present Illness  Date of onset: 2010  Mechanism of injury: Jarad Araya is a 50 y o  female who presents to outpatient Physical Therapy today with complaints of neck pain  States chronic pain in neck that has been progressively worsening  Has had medication on and off throughout the years, but has noticed recently that LUE strength is declining  X-ray FINDINGS:     No fracture      Normal alignment without subluxation      There are endplate and facet joint degenerative changes throughout the cervical spine most pronounced at C5-6 and C6-7      The neuroforamina are patent      The prevertebral soft tissues are within normal limits      The lung apices are clear      IMPRESSION:     No acute osseous abnormality      Degenerative changes as above      Pain  Current pain rating: 3  At best pain rating: 3  At worst pain ratin  Location: L c/s into superior shoulder and clavicle   Quality: dull ache, sharp, throbbing and tight    Social Support    Employment status: working (, , cook)  Hand dominance: right      Diagnostic Tests  X-ray: abnormal  Patient Goals  Patient goals for therapy: decreased pain, increased motion and increased strength  Patient goal: be able to turn head to left        Objective     Concurrent Complaints  Positive for headaches (3x/month)   Negative for dizziness, tinnitus, trouble swallowing, difficulty breathing and visual change    Postural Observations    Additional Postural Observation Details  Upper crossed    Neurological Testing     Sensation   Cervical/Thoracic   Left   Intact: light touch    Right   Intact: light touch    Reflexes   Left   Canas's reflex: negative    Right   Canas's reflex: negative    Active Range of Motion   Cervical/Thoracic Spine       Cervical    Subcranial retraction:   Restriction level: moderate  Flexion: 65 degrees   Extension: 55 degrees      Left lateral flexion: 50 degrees      Right lateral flexion: 45 degrees     with pain  Left rotation: 50 degrees  Right rotation: 75 degrees             Scapular Mobility   Left Shoulder   Scapular Dyskinesis: grade II  Scapular Mobility with Shoulder to 90° FF   Scapular winging: moderate (excessive abduction)    Scapular Mobility beyond 90° FF   Downward rotation: premature and excessive    Joint Play   Joints within functional limits: C1, C2 and C3     Hypomobile: C4, C5, C6, C7 and 1st rib   Mechanical Assessment    Cervical    Seated retraction: repeated movements   Pain location: no change  Seated Extension: repeated movements  Pain location: no change  improved L cervical rotation    Thoracic      Lumbar      Strength/Myotome Testing     Left Shoulder     Planes of Motion   Flexion: 4   Abduction: 4   External rotation at 0°: 4+   Internal rotation at 0°: 4+     Right Shoulder     Planes of Motion   Flexion: 5   Abduction: 5   External rotation at 0°: 5   Internal rotation at 0°: 5     Left Elbow   Flexion: 5  Extension: 5    Right Elbow   Flexion: 5  Extension: 5    Left Wrist/Hand      (2nd hand position)     Trial 1: 50    Trial 2: 45    Trial 3: 45    Comments: Slot 2    Right Wrist/Hand      (2nd hand position)     Trial 1: 35    Trial 2: 30    Trial 3: 30    Tests   Cervical   Positive craniocervical flexion test and neck flexor muscle endurance test   Negative vertical compression, alar ligament test, Sharp-Adrienne test and VBI  Left   Negative Spurling's Test B and cervical flexion-rotation test      Right   Negative Spurling's Test B and cervical flexion-rotation test      Lumbar   Negative vertical compression  General Comments:      Shoulder Comments   Depressed L shoulder with OH flexion>abduction  Restriction of L shoulder IR to L1 and ER to T1- states no pain, but stops  Cervical/Thoracic Comments    FOTO: 48% function, 57% predicted function     Neuro Exam:     Headaches   Patient reports headaches: Yes (3x/month)                Precautions: poor depth perception, 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 eye  Access code: [email or web address]  Progress note: 7/20  POC: 9/20    Manuals 6/20       stretching *       SOR        Cervical traction        C/S PA and side glide mobs *       T/S PA mobs        Scapular PNF        IASTM        Neuro Re-Ed        UBE *       scap retraction *       Shoulder rolls *       Chin tucks *       Supine chin tuck  :05x10       MTP/LTP        SNAG L rot 10x       Ther Ex        UT stretch 3x:30       LS stretch        Scalene stretch        Doorway stretch        C/S AROM        Serratus punch *       Prone row *       Prone I, T's thumbs up and down *       Standing YTI        Lat pull down        LT lift off Standing                                                Ther Activity                        Gait Training                        Modalities

## 2023-06-21 ENCOUNTER — HOSPITAL ENCOUNTER (OUTPATIENT)
Dept: MAMMOGRAPHY | Facility: HOSPITAL | Age: 48
Discharge: HOME | End: 2023-06-21
Payer: COMMERCIAL

## 2023-06-21 VITALS — WEIGHT: 114 LBS | BODY MASS INDEX: 20.98 KG/M2 | HEIGHT: 62 IN

## 2023-06-21 DIAGNOSIS — Z12.31 ENCOUNTER FOR SCREENING MAMMOGRAM FOR MALIGNANT NEOPLASM OF BREAST: ICD-10-CM

## 2023-06-21 PROCEDURE — 77063 BREAST TOMOSYNTHESIS BI: CPT

## 2023-06-21 PROCEDURE — 77067 SCR MAMMO BI INCL CAD: CPT

## 2023-06-27 ENCOUNTER — HOSPITAL ENCOUNTER (OUTPATIENT)
Dept: GASTROENTEROLOGY | Facility: HOSPITAL | Age: 48
Setting detail: OUTPATIENT SURGERY
Discharge: HOME | End: 2023-06-27
Payer: COMMERCIAL

## 2023-06-27 ENCOUNTER — ANESTHESIA EVENT (OUTPATIENT)
Dept: GASTROENTEROLOGY | Facility: HOSPITAL | Age: 48
End: 2023-06-27

## 2023-06-27 ENCOUNTER — ANESTHESIA (OUTPATIENT)
Dept: GASTROENTEROLOGY | Facility: HOSPITAL | Age: 48
End: 2023-06-27

## 2023-06-27 VITALS
OXYGEN SATURATION: 98 % | BODY MASS INDEX: 20.98 KG/M2 | RESPIRATION RATE: 16 BRPM | HEART RATE: 56 BPM | TEMPERATURE: 97.3 F | WEIGHT: 114 LBS | HEIGHT: 62 IN | DIASTOLIC BLOOD PRESSURE: 64 MMHG | SYSTOLIC BLOOD PRESSURE: 111 MMHG

## 2023-06-27 DIAGNOSIS — Z80.0 FAMILY HISTORY OF COLON CANCER: ICD-10-CM

## 2023-06-27 DIAGNOSIS — K21.9 GASTROESOPHAGEAL REFLUX DISEASE, UNSPECIFIED WHETHER ESOPHAGITIS PRESENT: ICD-10-CM

## 2023-06-27 DIAGNOSIS — Z86.010 PERSONAL HISTORY OF COLONIC POLYPS: ICD-10-CM

## 2023-06-27 DIAGNOSIS — K31.A0 GASTRIC INTESTINAL METAPLASIA: ICD-10-CM

## 2023-06-27 DIAGNOSIS — Z87.19 HISTORY OF RECTAL BLEEDING: ICD-10-CM

## 2023-06-27 DIAGNOSIS — R13.10 DYSPHAGIA, UNSPECIFIED TYPE: ICD-10-CM

## 2023-06-27 LAB
EXT PREGNANCY TEST URINE: NEGATIVE
EXT. CONTROL: NORMAL

## 2023-06-27 PROCEDURE — 81025 URINE PREGNANCY TEST: CPT | Performed by: INTERNAL MEDICINE

## 2023-06-27 PROCEDURE — 88313 SPECIAL STAINS GROUP 2: CPT | Performed by: PATHOLOGY

## 2023-06-27 PROCEDURE — 88305 TISSUE EXAM BY PATHOLOGIST: CPT | Performed by: PATHOLOGY

## 2023-06-27 RX ORDER — LIDOCAINE HYDROCHLORIDE 20 MG/ML
INJECTION, SOLUTION EPIDURAL; INFILTRATION; INTRACAUDAL; PERINEURAL AS NEEDED
Status: DISCONTINUED | OUTPATIENT
Start: 2023-06-27 | End: 2023-06-27

## 2023-06-27 RX ORDER — PROPOFOL 10 MG/ML
INJECTION, EMULSION INTRAVENOUS AS NEEDED
Status: DISCONTINUED | OUTPATIENT
Start: 2023-06-27 | End: 2023-06-27

## 2023-06-27 RX ORDER — SODIUM CHLORIDE, SODIUM LACTATE, POTASSIUM CHLORIDE, CALCIUM CHLORIDE 600; 310; 30; 20 MG/100ML; MG/100ML; MG/100ML; MG/100ML
125 INJECTION, SOLUTION INTRAVENOUS CONTINUOUS
Status: DISCONTINUED | OUTPATIENT
Start: 2023-06-27 | End: 2023-07-01 | Stop reason: HOSPADM

## 2023-06-27 RX ADMIN — SODIUM CHLORIDE, SODIUM LACTATE, POTASSIUM CHLORIDE, AND CALCIUM CHLORIDE: .6; .31; .03; .02 INJECTION, SOLUTION INTRAVENOUS at 11:58

## 2023-06-27 RX ADMIN — SODIUM CHLORIDE, SODIUM LACTATE, POTASSIUM CHLORIDE, AND CALCIUM CHLORIDE 125 ML/HR: .6; .31; .03; .02 INJECTION, SOLUTION INTRAVENOUS at 12:01

## 2023-06-27 RX ADMIN — PROPOFOL 150 MG: 10 INJECTION, EMULSION INTRAVENOUS at 12:16

## 2023-06-27 RX ADMIN — PROPOFOL 50 MG: 10 INJECTION, EMULSION INTRAVENOUS at 12:27

## 2023-06-27 RX ADMIN — LIDOCAINE HYDROCHLORIDE 100 MG: 20 INJECTION, SOLUTION EPIDURAL; INFILTRATION; INTRACAUDAL; PERINEURAL at 12:16

## 2023-06-27 RX ADMIN — PROPOFOL 50 MG: 10 INJECTION, EMULSION INTRAVENOUS at 12:20

## 2023-06-27 NOTE — INTERVAL H&P NOTE
H&P reviewed  After examining the patient I find no changes in the patients condition since the H&P had been written      Vitals:    06/27/23 1157   BP: 144/68   Pulse: 65   Resp: 18   Temp: 97 7 °F (36 5 °C)   SpO2: 98%

## 2023-06-27 NOTE — ANESTHESIA POSTPROCEDURE EVALUATION
Post-Op Assessment Note    CV Status:  Stable  Pain Score: 0    Pain management: adequate     Mental Status:  Awake and sleepy   Hydration Status:  Euvolemic   PONV Controlled:  Controlled   Airway Patency:  Patent      Post Op Vitals Reviewed: Yes      Staff: CRNA         No notable events documented      BP   116/63   Temp      Pulse  65   Resp   12   SpO2   99

## 2023-06-27 NOTE — ANESTHESIA PREPROCEDURE EVALUATION
Procedure:  EGD  COLONOSCOPY    Relevant Problems   CARDIO   (+) Bleeding hemorrhoid      GI/HEPATIC   (+) Bleeding hemorrhoid      HEMATOLOGY   (+) Iron deficiency anemia        Physical Exam    Airway    Mallampati score: II  TM Distance: >3 FB  Neck ROM: full     Dental   No notable dental hx     Cardiovascular      Pulmonary      Other Findings        Anesthesia Plan  ASA Score- 2     Anesthesia Type- IV sedation with anesthesia with ASA Monitors  Additional Monitors:   Airway Plan:           Plan Factors-Exercise tolerance (METS): >4 METS  Chart reviewed  Existing labs reviewed  Patient summary reviewed  Patient is not a current smoker  Obstructive sleep apnea risk education given perioperatively  Induction- intravenous  Postoperative Plan-     Informed Consent- Anesthetic plan and risks discussed with patient  I personally reviewed this patient with the CRNA  Discussed and agreed on the Anesthesia Plan with the CRNA  Kaitlynn Urbina

## 2023-06-29 ENCOUNTER — APPOINTMENT (OUTPATIENT)
Dept: PHYSICAL THERAPY | Facility: CLINIC | Age: 48
End: 2023-06-29
Payer: COMMERCIAL

## 2023-06-29 PROCEDURE — 88305 TISSUE EXAM BY PATHOLOGIST: CPT | Performed by: PATHOLOGY

## 2023-06-29 PROCEDURE — 88313 SPECIAL STAINS GROUP 2: CPT | Performed by: PATHOLOGY

## 2023-11-04 ENCOUNTER — HOSPITAL ENCOUNTER (EMERGENCY)
Facility: HOSPITAL | Age: 48
Discharge: HOME | End: 2023-11-04
Attending: EMERGENCY MEDICINE
Payer: COMMERCIAL

## 2023-11-04 ENCOUNTER — APPOINTMENT (EMERGENCY)
Dept: CT IMAGING | Facility: HOSPITAL | Age: 48
End: 2023-11-04
Payer: COMMERCIAL

## 2023-11-04 ENCOUNTER — APPOINTMENT (EMERGENCY)
Dept: ULTRASOUND IMAGING | Facility: HOSPITAL | Age: 48
End: 2023-11-04
Payer: COMMERCIAL

## 2023-11-04 VITALS
SYSTOLIC BLOOD PRESSURE: 113 MMHG | TEMPERATURE: 99.1 F | DIASTOLIC BLOOD PRESSURE: 55 MMHG | OXYGEN SATURATION: 97 % | RESPIRATION RATE: 18 BRPM | HEART RATE: 82 BPM

## 2023-11-04 DIAGNOSIS — N61.0 CELLULITIS OF LEFT BREAST: ICD-10-CM

## 2023-11-04 DIAGNOSIS — N63.0 SWELLING OF BREAST: ICD-10-CM

## 2023-11-04 DIAGNOSIS — R51.9 HEADACHE: ICD-10-CM

## 2023-11-04 DIAGNOSIS — N64.4 BREAST PAIN, LEFT: Primary | ICD-10-CM

## 2023-11-04 LAB
ALBUMIN SERPL BCP-MCNC: 4.5 G/DL (ref 3.5–5)
ALP SERPL-CCNC: 48 U/L (ref 34–104)
ALT SERPL W P-5'-P-CCNC: 11 U/L (ref 7–52)
ANION GAP SERPL CALCULATED.3IONS-SCNC: 9 MMOL/L
APTT PPP: 27 SECONDS (ref 23–37)
AST SERPL W P-5'-P-CCNC: 17 U/L (ref 13–39)
BASOPHILS # BLD MANUAL: 0 THOUSAND/UL (ref 0–0.1)
BASOPHILS NFR MAR MANUAL: 0 % (ref 0–1)
BILIRUB SERPL-MCNC: 1.92 MG/DL (ref 0.2–1)
BUN SERPL-MCNC: 7 MG/DL (ref 5–25)
CALCIUM SERPL-MCNC: 9.8 MG/DL (ref 8.4–10.2)
CHLORIDE SERPL-SCNC: 103 MMOL/L (ref 96–108)
CO2 SERPL-SCNC: 25 MMOL/L (ref 21–32)
CREAT SERPL-MCNC: 0.6 MG/DL (ref 0.6–1.3)
EOSINOPHIL # BLD MANUAL: 0 THOUSAND/UL (ref 0–0.4)
EOSINOPHIL NFR BLD MANUAL: 0 % (ref 0–6)
ERYTHROCYTE [DISTWIDTH] IN BLOOD BY AUTOMATED COUNT: 12.8 % (ref 11.6–15.1)
GFR SERPL CREATININE-BSD FRML MDRD: 108 ML/MIN/1.73SQ M
GLUCOSE SERPL-MCNC: 107 MG/DL (ref 65–140)
HCT VFR BLD AUTO: 42.5 % (ref 34.8–46.1)
HGB BLD-MCNC: 14.1 G/DL (ref 11.5–15.4)
INR PPP: 0.99 (ref 0.84–1.19)
LYMPHOCYTES # BLD AUTO: 1.7 THOUSAND/UL (ref 0.6–4.47)
LYMPHOCYTES # BLD AUTO: 13 % (ref 14–44)
MCH RBC QN AUTO: 31.1 PG (ref 26.8–34.3)
MCHC RBC AUTO-ENTMCNC: 33.2 G/DL (ref 31.4–37.4)
MCV RBC AUTO: 94 FL (ref 82–98)
MONOCYTES # BLD AUTO: 0.91 THOUSAND/UL (ref 0–1.22)
MONOCYTES NFR BLD: 7 % (ref 4–12)
NEUTROPHILS # BLD MANUAL: 10.45 THOUSAND/UL (ref 1.85–7.62)
NEUTS SEG NFR BLD AUTO: 80 % (ref 43–75)
PLATELET # BLD AUTO: 239 THOUSANDS/UL (ref 149–390)
PLATELET BLD QL SMEAR: ADEQUATE
PMV BLD AUTO: 11.3 FL (ref 8.9–12.7)
POTASSIUM SERPL-SCNC: 3.6 MMOL/L (ref 3.5–5.3)
PROCALCITONIN SERPL-MCNC: <0.05 NG/ML
PROT SERPL-MCNC: 7.2 G/DL (ref 6.4–8.4)
PROTHROMBIN TIME: 13.2 SECONDS (ref 11.6–14.5)
RBC # BLD AUTO: 4.53 MILLION/UL (ref 3.81–5.12)
RBC MORPH BLD: NORMAL
SODIUM SERPL-SCNC: 137 MMOL/L (ref 135–147)
WBC # BLD AUTO: 13.06 THOUSAND/UL (ref 4.31–10.16)

## 2023-11-04 PROCEDURE — 96361 HYDRATE IV INFUSION ADD-ON: CPT

## 2023-11-04 PROCEDURE — 85007 BL SMEAR W/DIFF WBC COUNT: CPT | Performed by: EMERGENCY MEDICINE

## 2023-11-04 PROCEDURE — 96374 THER/PROPH/DIAG INJ IV PUSH: CPT

## 2023-11-04 PROCEDURE — 85730 THROMBOPLASTIN TIME PARTIAL: CPT | Performed by: EMERGENCY MEDICINE

## 2023-11-04 PROCEDURE — 99284 EMERGENCY DEPT VISIT MOD MDM: CPT

## 2023-11-04 PROCEDURE — 99285 EMERGENCY DEPT VISIT HI MDM: CPT | Performed by: EMERGENCY MEDICINE

## 2023-11-04 PROCEDURE — 85610 PROTHROMBIN TIME: CPT | Performed by: EMERGENCY MEDICINE

## 2023-11-04 PROCEDURE — 80053 COMPREHEN METABOLIC PANEL: CPT | Performed by: EMERGENCY MEDICINE

## 2023-11-04 PROCEDURE — 96375 TX/PRO/DX INJ NEW DRUG ADDON: CPT

## 2023-11-04 PROCEDURE — 36415 COLL VENOUS BLD VENIPUNCTURE: CPT | Performed by: EMERGENCY MEDICINE

## 2023-11-04 PROCEDURE — G1004 CDSM NDSC: HCPCS

## 2023-11-04 PROCEDURE — 85027 COMPLETE CBC AUTOMATED: CPT | Performed by: EMERGENCY MEDICINE

## 2023-11-04 PROCEDURE — 70498 CT ANGIOGRAPHY NECK: CPT

## 2023-11-04 PROCEDURE — 70496 CT ANGIOGRAPHY HEAD: CPT

## 2023-11-04 PROCEDURE — 76642 ULTRASOUND BREAST LIMITED: CPT

## 2023-11-04 PROCEDURE — 84145 PROCALCITONIN (PCT): CPT | Performed by: EMERGENCY MEDICINE

## 2023-11-04 RX ORDER — DIPHENHYDRAMINE HYDROCHLORIDE 50 MG/ML
25 INJECTION INTRAMUSCULAR; INTRAVENOUS ONCE
Status: COMPLETED | OUTPATIENT
Start: 2023-11-04 | End: 2023-11-04

## 2023-11-04 RX ORDER — DOXYCYCLINE HYCLATE 100 MG/1
100 CAPSULE ORAL 2 TIMES DAILY
Qty: 14 CAPSULE | Refills: 0 | Status: SHIPPED | OUTPATIENT
Start: 2023-11-04 | End: 2023-11-11

## 2023-11-04 RX ORDER — ACETAMINOPHEN 325 MG/1
975 TABLET ORAL ONCE
Status: COMPLETED | OUTPATIENT
Start: 2023-11-04 | End: 2023-11-04

## 2023-11-04 RX ORDER — KETOROLAC TROMETHAMINE 30 MG/ML
15 INJECTION, SOLUTION INTRAMUSCULAR; INTRAVENOUS ONCE
Status: COMPLETED | OUTPATIENT
Start: 2023-11-04 | End: 2023-11-04

## 2023-11-04 RX ORDER — DOXYCYCLINE HYCLATE 100 MG/1
100 CAPSULE ORAL ONCE
Status: COMPLETED | OUTPATIENT
Start: 2023-11-04 | End: 2023-11-04

## 2023-11-04 RX ORDER — METOCLOPRAMIDE HYDROCHLORIDE 5 MG/ML
10 INJECTION INTRAMUSCULAR; INTRAVENOUS ONCE
Status: COMPLETED | OUTPATIENT
Start: 2023-11-04 | End: 2023-11-04

## 2023-11-04 RX ADMIN — METOCLOPRAMIDE 10 MG: 5 INJECTION, SOLUTION INTRAMUSCULAR; INTRAVENOUS at 13:22

## 2023-11-04 RX ADMIN — DIPHENHYDRAMINE HYDROCHLORIDE 25 MG: 50 INJECTION, SOLUTION INTRAMUSCULAR; INTRAVENOUS at 13:21

## 2023-11-04 RX ADMIN — KETOROLAC TROMETHAMINE 15 MG: 30 INJECTION, SOLUTION INTRAMUSCULAR; INTRAVENOUS at 14:34

## 2023-11-04 RX ADMIN — IOHEXOL 75 ML: 350 INJECTION, SOLUTION INTRAVENOUS at 13:51

## 2023-11-04 RX ADMIN — ACETAMINOPHEN 975 MG: 325 TABLET ORAL at 13:12

## 2023-11-04 RX ADMIN — DOXYCYCLINE 100 MG: 100 CAPSULE ORAL at 14:10

## 2023-11-04 RX ADMIN — SODIUM CHLORIDE 1000 ML: 0.9 INJECTION, SOLUTION INTRAVENOUS at 13:21

## 2023-11-04 NOTE — ED PROVIDER NOTES
History  Chief Complaint   Patient presents with    Breast Pain     Patient reports left breast pain and swelling since yesterday. Patient reports hx of complex cysts in this breast. Patient also reporting a headache since last evening. Denies breast feeding or injury to the breast      Patient is a 51-year-old female with a history of lupus, who presents for evaluation of a swollen left breast pain and swelling. Patient says the symptoms have been present since yesterday. Patient says she had a history of complex cysts in her breast but other than that her mammogram 3 months ago was normal.  She admits to subjective fevers. She denies any nipple discharge. She is also complaining of a headache. Patient says that the headache started last night. She says that it is gradually gotten worse since last night. She says it is associated with some neck pain/stiffness. She also admits to some photophobia. She says she does not normally get headaches. She took some Excedrin around 8 AM this morning with little relief. Prior to Admission Medications   Prescriptions Last Dose Informant Patient Reported? Taking?   famotidine (PEPCID) 40 MG tablet   No No   Sig: Take 1 tablet (40 mg total) by mouth daily at bedtime      Facility-Administered Medications: None       Past Medical History:   Diagnosis Date    Hyperparathyroidism ([address])     Lupus ([address])        Past Surgical History:   Procedure Laterality Date    BREAST BIOPSY Left 2019    benign    PARATHYROIDECTOMY      2020       Family History   Problem Relation Age of Onset    Multiple sclerosis Mother     Colon cancer Father     No Known Problems Daughter     No Known Problems Daughter     No Known Problems Maternal Grandmother     No Known Problems Paternal Grandmother     Colon cancer Paternal Aunt     Colon cancer Paternal Aunt      I have reviewed and agree with the history as documented.     E-Cigarette/Vaping    E-Cigarette Use Never User E-Cigarette/Vaping Substances     Social History     Tobacco Use    Smoking status: Never    Smokeless tobacco: Never   Vaping Use    Vaping Use: Never used   Substance Use Topics    Alcohol use: Not Currently    Drug use: Never       Review of Systems   Constitutional:  Negative for chills, diaphoresis, fever and unexpected weight change. HENT:  Negative for congestion, ear pain, sinus pressure, sore throat and trouble swallowing. Eyes:  Negative for pain, discharge, redness and itching. Respiratory:  Negative for cough, chest tightness, shortness of breath and wheezing. Cardiovascular:  Negative for chest pain, palpitations and leg swelling. Gastrointestinal:  Negative for abdominal distention, abdominal pain, blood in stool, diarrhea, nausea and vomiting. Endocrine: Negative for polyphagia and polyuria. Genitourinary:  Negative for difficulty urinating, dysuria, flank pain, hematuria, pelvic pain and vaginal bleeding. Musculoskeletal:  Negative for arthralgias, back pain and myalgias. Left breast pain, swelling, tenderness     Skin:  Negative for rash. Neurological:  Positive for headaches. Negative for dizziness, syncope, weakness and light-headedness. Physical Exam  Physical Exam  Vitals and nursing note reviewed. Constitutional:       General: She is not in acute distress. Appearance: She is well-developed. HENT:      Head: Normocephalic and atraumatic. Right Ear: External ear normal.      Left Ear: External ear normal.      Nose: Nose normal.      Mouth/Throat:      Mouth: Mucous membranes are moist.      Pharynx: No oropharyngeal exudate. Eyes:      Conjunctiva/sclera: Conjunctivae normal.      Pupils: Pupils are equal, round, and reactive to light. Cardiovascular:      Rate and Rhythm: Normal rate and regular rhythm. Heart sounds: Normal heart sounds. No murmur heard. No friction rub. No gallop.    Pulmonary:      Effort: Pulmonary effort is normal. No respiratory distress. Breath sounds: Normal breath sounds. No wheezing or rales. Chest:       Abdominal:      General: There is no distension. Palpations: Abdomen is soft. Tenderness: There is no abdominal tenderness. There is no guarding. Musculoskeletal:         General: No swelling, tenderness or deformity. Normal range of motion. Cervical back: Normal range of motion and neck supple. Lymphadenopathy:      Cervical: No cervical adenopathy. Skin:     General: Skin is warm and dry. Neurological:      General: No focal deficit present. Mental Status: She is alert and oriented to person, place, and time. Mental status is at baseline. Cranial Nerves: No cranial nerve deficit. Sensory: No sensory deficit. Motor: No weakness or abnormal muscle tone.       Coordination: Coordination normal.         Vital Signs  ED Triage Vitals   Temperature Pulse Respirations Blood Pressure SpO2   11/04/23 1254 11/04/23 1254 11/04/23 1254 11/04/23 1254 11/04/23 1254   99.1 °F (37.3 °C) 98 18 130/69 96 %      Temp src Heart Rate Source Patient Position - Orthostatic VS BP Location FiO2 (%)   -- 11/04/23 1514 11/04/23 1514 11/04/23 1514 --    Monitor Sitting Left arm       Pain Score       11/04/23 1254       7           Vitals:    11/04/23 1254 11/04/23 1514   BP: 130/69 113/55   Pulse: 98 82   Patient Position - Orthostatic VS:  Sitting         Visual Acuity  Visual Acuity      Flowsheet Row Most Recent Value   L Pupil Size (mm) 3   R Pupil Size (mm) 3            ED Medications  Medications   sodium chloride 0.9 % bolus 1,000 mL (0 mL Intravenous Stopped 11/4/23 1513)   acetaminophen (TYLENOL) tablet 975 mg (975 mg Oral Given 11/4/23 1312)   metoclopramide (REGLAN) injection 10 mg (10 mg Intravenous Given 11/4/23 1322)   diphenhydrAMINE (BENADRYL) injection 25 mg (25 mg Intravenous Given 11/4/23 1321)   iohexol (OMNIPAQUE) 350 MG/ML injection (MULTI-DOSE) 100 mL (75 mL Intravenous Given 11/4/23 1351)   doxycycline hyclate (VIBRAMYCIN) capsule 100 mg (100 mg Oral Given 11/4/23 1410)   ketorolac (TORADOL) injection 15 mg (15 mg Intravenous Given 11/4/23 1434)       Diagnostic Studies  Results Reviewed       Procedure Component Value Units Date/Time    Procalcitonin [[id number]]  (Normal) Collected: 11/04/23 1318    Lab Status: Final result Specimen: Blood from Arm, Right Updated: 11/04/23 1412     Procalcitonin <0.05 ng/ml     RBC Morphology Reflex Test [[id number]] Collected: 11/04/23 1318    Lab Status: Final result Specimen: Blood from Arm, Right Updated: 11/04/23 1401    Comprehensive metabolic panel [[id number]]  (Abnormal) Collected: 11/04/23 1318    Lab Status: Final result Specimen: Blood from Arm, Right Updated: 11/04/23 1347     Sodium 137 mmol/L      Potassium 3.6 mmol/L      Chloride 103 mmol/L      CO2 25 mmol/L      ANION GAP 9 mmol/L      BUN 7 mg/dL      Creatinine 0.60 mg/dL      Glucose 107 mg/dL      Calcium 9.8 mg/dL      AST 17 U/L      ALT 11 U/L      Alkaline Phosphatase 48 U/L      Total Protein 7.2 g/dL      Albumin 4.5 g/dL      Total Bilirubin 1.92 mg/dL      eGFR 108 ml/min/1.73sq m     Narrative:      [facility] guidelines for Chronic Kidney Disease (CKD):     Stage 1 with normal or high GFR (GFR > 90 mL/min/1.73 square meters)    Stage 2 Mild CKD (GFR = 60-89 mL/min/1.73 square meters)    Stage 3A Moderate CKD (GFR = 45-59 mL/min/1.73 square meters)    Stage 3B Moderate CKD (GFR = 30-44 mL/min/1.73 square meters)    Stage 4 Severe CKD (GFR = 15-29 mL/min/1.73 square meters)    Stage 5 End Stage CKD (GFR <15 mL/min/1.73 square meters)  Note: GFR calculation is accurate only with a steady state creatinine    CBC and differential [[id number]]  (Abnormal) Collected: 11/04/23 1318    Lab Status: Final result Specimen: Blood from Arm, Right Updated: 11/04/23 1341     WBC 13.06 Thousand/uL      RBC 4.53 Million/uL      Hemoglobin 14.1 g/dL Hematocrit 42.5 %      MCV 94 fL      MCH 31.1 pg      MCHC 33.2 g/dL      RDW 12.8 %      MPV 11.3 fL      Platelets 010 Thousands/uL     Narrative: This is an appended report. These results have been appended to a previously verified report. Manual Differential(PHLEBS Do Not Order) [[id number]]  (Abnormal) Collected: 11/04/23 1318    Lab Status: Final result Specimen: Blood from Arm, Right Updated: 11/04/23 1341     Segmented % 80 %      Lymphocytes % 13 %      Monocytes % 7 %      Eosinophils, % 0 %      Basophils % 0 %      Absolute Neutrophils 10.45 Thousand/uL      Lymphocytes Absolute 1.70 Thousand/uL      Monocytes Absolute 0.91 Thousand/uL      Eosinophils Absolute 0.00 Thousand/uL      Basophils Absolute 0.00 Thousand/uL      Total Counted --     RBC Morphology Normal     Platelet Estimate Adequate    Protime-INR [[id number]]  (Normal) Collected: 11/04/23 1318    Lab Status: Final result Specimen: Blood from Arm, Right Updated: 11/04/23 1340     Protime 13.2 seconds      INR 0.99    APTT [[id number]]  (Normal) Collected: 11/04/23 1318    Lab Status: Final result Specimen: Blood from Arm, Right Updated: 11/04/23 1340     PTT 27 seconds                    CTA head and neck with and without contrast   Final Result by John Cotter MD (11/04 1424)      No mass effect, acute intracranial hemorrhage or evidence of recent infarction. No evidence for high-grade stenosis, focal occlusion or vascular aneurysm of the cervical or intracranial vessels. Workstation performed: [id number]         US breast left limited (diagnostic)   Final Result by Peggy Dean MD (11/04 1428)                 Procedures  Procedures         ED Course                               SBIRT 22yo+      Flowsheet Row Most Recent Value   Initial Alcohol Screen: US AUDIT-C     1. How often do you have a drink containing alcohol? 0 Filed at: 11/04/2023 1254   2.  How many drinks containing alcohol do you have on a typical day you are drinking? 0 Filed at: 11/04/2023 1254   3a. Male UNDER 65: How often do you have five or more drinks on one occasion? 0 Filed at: 11/04/2023 1254   3b. FEMALE Any Age, or MALE 65+: How often do you have 4 or more drinks on one occassion? 0 Filed at: 11/04/2023 1254   Audit-C Score 0 Filed at: 11/04/2023 1254   REGULO: How many times in the past year have you. .. Used an illegal drug or used a prescription medication for non-medical reasons? Never Filed at: 11/04/2023 1254                      Medical Decision Making  77-year-old female presenting for evaluation of left breast pain, swelling, tenderness as well as a headache. Breast pain started last night, has been progressing. Admits to worsening swelling in the left breast.  There is mild erythema and induration to the left lateral breast.  There is no nipple discharge. Does have a history of complex cyst  Differential for breast pain includes mastitis versus abscess versus infected cyst.  Spoke with Dr. Rafaela Darnell of general surgery who recommended getting an ultrasound to rule out a breast abscess. Recommended giving a dose of antibiotics. In regards to headache, has been gradual since last night, but says it is worse than headache she has had in the past and she is not prone to headaches. Also admits to some neck stiffness and photophobia. Patient has no focal neurologic deficits on exam.  She is in no acute distress. Given difference from previous headaches, will obtain CTA head and neck. Will give Tylenol, Reglan, Benadryl. Will hold off on Toradol until CTA is read. CTA within normal limits. Patient's headache improved. CBC does show mild leukocytosis. Breast ultrasound shows no evidence of abscess. Spoke with Dr. Rafaela Darnell who said to continue antibiotics, she will have someone from the office follow-up with her on Monday to set up an appointment. Patient told to continue antibiotic.   Told to apply warm compresses, continue with Tylenol and Motrin    Problems Addressed:  Breast pain, left: acute illness or injury  Cellulitis of left breast: acute illness or injury  Headache: acute illness or injury  Swelling of breast: acute illness or injury    Amount and/or Complexity of Data Reviewed  Labs: ordered. Radiology: ordered. Risk  OTC drugs. Prescription drug management. Disposition  Final diagnoses:   Breast pain, left   Swelling of breast   Cellulitis of left breast   Headache     Time reflects when diagnosis was documented in both MDM as applicable and the Disposition within this note       Time User Action Codes Description Comment    11/4/2023  2:57 PM Lalit Brace Add [N64.4] Breast pain, left     11/4/2023  2:58 PM Lalit Brace Add [N63.0] Swelling of breast     11/4/2023  3:00 PM Lalit Brace Add [N61.0] Cellulitis of left breast     11/4/2023  3:03 PM Lalit Brace Add [R51.9] Headache           ED Disposition       ED Disposition   Discharge    Condition   Stable    Date/Time   Sat Nov 4, 2023 [id number] [facility] discharge to home/self care.                    Follow-up Information       Follow up With Specialties Details Why Contact Info    Adriel Beltran MD General Surgery, Surgical Oncology Call in 2 days To schedule follow up appointment for left breast swelling/pain [address] [facility]  [phone]              Discharge Medication List as of 11/4/2023  3:01 PM        START taking these medications    Details   doxycycline hyclate (VIBRAMYCIN) 100 mg capsule Take 1 capsule (100 mg total) by mouth 2 (two) times a day for 7 days, Starting Sat 11/4/2023, Until Sat 11/11/2023, Normal           CONTINUE these medications which have NOT CHANGED    Details   famotidine (PEPCID) 40 MG tablet Take 1 tablet (40 mg total) by mouth daily at bedtime, Starting Tue 6/13/2023, Normal                 PDMP Review       None            ED Provider  Electronically Signed by             Dick Hines,   11/04/23 1527

## 2023-11-04 NOTE — Clinical Note
Abbi Guillen was seen and treated in our emergency department on 11/4/2023. Diagnosis: breast cellulitis/pain    Julio Estrella  may return to work on return date. She may return on this date: 11/07/2023         If you have any questions or concerns, please don't hesitate to call.       Carlton Lua, DO    ______________________________           _______________          _______________  Hospital Representative                              Date                                Time

## 2023-11-06 ENCOUNTER — TELEPHONE (OUTPATIENT)
Dept: SURGERY | Facility: CLINIC | Age: 48
End: 2023-11-06

## 2023-11-06 NOTE — TELEPHONE ENCOUNTER
Patient was seen in the ER, needs new patient appointment with Dr. Nelida Sicard in the next 1-2 weeks as she has not formally seen her. She is for a left breast infection without abscess. Placed call to patient and left a message to return our call, also sent [employer] message.

## 2023-11-29 ENCOUNTER — TELEPHONE (OUTPATIENT)
Dept: GENETICS | Facility: CLINIC | Age: 48
End: 2023-11-29

## 2023-11-29 NOTE — TELEPHONE ENCOUNTER
I called and left a message for Rosario to confirm her upcoming appointment she was encouraged to log into her "[employer]hart or call our office.

## 2023-12-05 ENCOUNTER — DOCUMENTATION (OUTPATIENT)
Dept: GENETICS | Facility: CLINIC | Age: 48
End: 2023-12-05

## 2023-12-06 ENCOUNTER — CLINICAL SUPPORT (OUTPATIENT)
Dept: GENETICS | Facility: CLINIC | Age: 48
End: 2023-12-06
Payer: COMMERCIAL

## 2023-12-06 ENCOUNTER — DOCUMENTATION (OUTPATIENT)
Dept: GENETICS | Facility: CLINIC | Age: 48
End: 2023-12-06

## 2023-12-06 DIAGNOSIS — Z86.010 PERSONAL HISTORY OF COLONIC POLYPS: ICD-10-CM

## 2023-12-06 DIAGNOSIS — Z80.0 FAMILY HISTORY OF COLON CANCER: ICD-10-CM

## 2023-12-06 PROCEDURE — 36415 COLL VENOUS BLD VENIPUNCTURE: CPT

## 2023-12-06 NOTE — LETTER
2023     Jesús Worthy, [address]    Patient: Daria Partida  YOB: 1975  Date of Visit: 2023      Dear Dr. Chauncey Loo: Thank you for referring Daria Partida to me for evaluation. Below are my notes for this consultation. If you have questions, please do not hesitate to call me. I look forward to following your patient along with you. Sincerely,        Jarad Eli        CC: No Recipients        Pre-Test Genetic Counseling Consult Note    Patient Name: Daria Partida   /Age: 1975/48 y.o. Referring Provider:  Jesús Worthy PA-C    Date of Service: 2023  Genetic Counselor: Jarad Eli MS, [facility]  Interpretation Services: None  [facility] [address] was referred to the [address] [place], [place] [id number] [facility] and Genetic Assessment Program due to her family history of colorectal cancer . she presents today to discuss the possibility of a hereditary cancer syndrome, options for genetic testing, and implications for her and her family. Cancer History and Treatment:   Personal History: no personal history of cancer     Screening Hx:   Breast:  Breast Imaging:   Mammogram (2023): Impression: No mammographic evidence of malignancy. Left Breast Biopsy (): benign   Breast Density: Extremely dense    Colon:  Colonoscopy (): 0 polyps reported  F/u recommended in 5 years     Gynecologic:  Ovaries and Uterus intact     Skin:  No current screening    Other screening:   Upper GI:  Endoscopy (): The esophagus appeared normal.   Nodular mucosa in the pylorus; Gastric mapping biopsies obtained  The duodenum appeared normal. Performed random biopsy to rule out celiac disease.      Reproductive History  Age at menarche: 15  Age at first live birth:  21  Menopause: Perimenopausal  Hormone replacement: none     Medical and Surgical History  Pertinent surgical history:   Past Surgical History:   Procedure Laterality Date   • BREAST BIOPSY Left 2019    benign   • PARATHYROIDECTOMY      2020      Pertinent medical history:  Past Medical History:   Diagnosis Date   • Hyperparathyroidism ([address])    • Lupus ([address])          Other History:  Height:   Ht Readings from Last 1 Encounters:   06/27/23 5' 2" (1.575 m)     Weight:   Wt Readings from Last 1 Encounters:   06/27/23 51.7 kg (114 lb)       Relevant Family History   Patient reports non-Ashkenazi Jew ancestry. Maternal Family History:  Grandmother: leukemia (d.60s)     Paternal Family History:  Father: colorectal cancer diagnosed at 39 (currently 80 y/o)   Uncle: colon cancer diagnosed at unknown age, no info/contact (living)  Uncle: colon cancer diagnosed at unknown age, no info/contact (living)  Aunt: colorectal cancer diagnosed at 39 (currently 76s)  Aunt: colorectal cancer diagnosed at 52 (d.49)  Uncle: colorectal cancer diagnosed at 48 (d.60s)     Camila Juarez is not in contact with most relatives on her paternal side and has limited information regarding their family history and structure     Please refer to the scanned pedigree in the Media Tab for a complete family history     *All history is reported as provided by the patient; records are not available for review, except where indicated. Assessment:  We discussed sporadic, familial and hereditary cancer. We reviewed that only 5-10% of cancers are considered hereditary. Cancers such as lung cancer, cervical cancer, testicular cancer, and liver cancer very rarely have a hereditary etiology, and we cannot test for a genetic predisposition for these cancers at this time. We also discussed the many factors that influence our risk for cancer such as age, environmental exposures, lifestyle choices and family history. We reviewed the indications suggestive of a hereditary predisposition to cancer.   We discussed the possibility of Arita syndrome given the number of paternal relatives with colorectal cancer at relatives young ages. We reviewed the cancer risks and NCCN screening recommendations for individuals with Arita syndrome. Genetic testing is indicated for Rosario based on the following criteria: Meets NCCN V1.2023 Testing Criteria for the Evaluation of Arita syndrome: first-degree relative (father) with colorectal cancer diagnosed <51 y/o. Cullen Montenegro is unaffected, but is considering genetic testing due to a family history of colorectal cancer. Although Rosario's father is the most informative person to undergo genetic testing, Cullen Montenegro can consider genetic testing due to the following:   Patient does not have cancer; however, their father has declined testing and   therefore patient is the most informative person for testing. The risks, benefits, and limitations of genetic testing were reviewed with the patient, as well as genetic discrimination laws, and possible test results (positive, negative, variants of uncertain significance) and their clinical implications. If positive for a mutation, options for managing cancer risk including increased surveillance, chemoprevention, and in some cases prophylactic surgery were discussed. Cullen Montenegro was informed that if a hereditary cancer syndrome was identified in her, first degree relatives (parents, siblings, and children) have a chance of also inheriting the condition. Genetic testing would allow for predictive genetic testing in other relatives, who may also be at risk depending on their degree of relation. Collni Zhang mentioned a personal history of joint hyperflexibility, skin hyperextensibility, and nearsightedness. Her presentation is suspicious for a connective tissue disorder. We discussed the possibility of Bruno Danlos syndrome (EDS). Collin Zhang has reportedly followed up with a cardiologist for assessment of aortic root dilatation.  Collin Zhang mentioned that her son is s/p a partial right pneumonectomy secondary to a pneumothorax at 10 y/o. He is reportedly nearsighted and has a tall and lanky stature. We discussed the possibility that her son has Marfan syndrome. We strongly recommended that Christiana Edwards and her son follow up with a  and consider genetic testing for connective tissue disorders. We reviewed that most connective tissue disorders follow autosomal dominant inheritance. Thus, if it were confirmed via molecular testing, her children could pursue genetic testing and follow up with appropriate specialists. Christiana Edwards understands that there are different types of EDS and molecular confirmation has not been established for all types. We informed Maegan that some types of EDS are associated with a significantly increased risk for uterine rupture during pregnancy. Thus, a genetics evaluation may be beneficial before her children become pregnant. Christiana Edwards was given the number of the genetics department at [facility]. [address] mentioned that she had direct-to-consumer genetic testing through a [address] [place] [address] that identified an increased lifetime risk for pancreatic cancer. She does not know which genes were included on the test or how the lifetime risk was calculated. We reviewed that cancer susceptibility genes that cause a predisposition to pancreatic cancer will be included on her hereditary cancer testing. Christiana Edwards understands that if she tests positive for Arita syndrome, she would not qualify for pancreatic cancer screening in the absence of a family history. Christiana Edwards understands that the false negative and false positive rates among direct-to-consumer genetic testing is high and oftentimes these companies only test for the most common mutations in many genes. Billing:  Most individuals pay <$100 for hereditary cancer genetic testing.  If insurance covers the cost of the testing, individuals may still pay out of pocket secondary to co-pays, co-insurance, or deductibles. If the cost of the testing exceeds $100, the lab will reach out to the patient via phone or e-mail. The patient will then have the option to proceed with the testing, cancel the testing, or elect the self-pay option of $250. Rosario verbalized understanding. Plan: Patient decided to proceed with testing and provided consent. Summary:     Sample Collection:  The patient's blood sample was drawn in the office on 12/6/23 by medical assistant Huang Herrera    Genetic Testing Preformed: CustomNext: Cancer® +[employer]® (61 genes): APC, GARY, AXIN2, BAP1, BARD1, BMPR1A, BRCA1, BRCA2, BRIP1, CDH1, CDK4, CDKN1B, CDKN2A, CHEK2, CTNNA1, DICER1, EGLN1, EPCAM, FH, FLCN, GREM1, HOXB13, KIF1B, KIT, MAX, MEN1, MET, MITF, MLH1, MSH2, MSH3, MSH6, MUTYH, NBN, NF1, NTHL1, PALB2, PMS2, POLD1, POLE, POT1, PTEN, RAD50, RAD51C, RAD51D, RB1, RECQL, RET, SDHA, SDHAF2, SDHB, SDHC, SDHD, SMAD4, SMARCA4, STK11, NFHE508, TP53, TSC1, TSC2, VHL      Result Call Information:  In the event that we need to reach [facility] via telephone:  I confirmed the patient's mobile number on file as the best number to call with results  I confirmed with the patient that we can leave a voicemail on her mobile number    Results take approximately 2-3 weeks to complete once test is started. [facility] will be notified via Veodint once results are available. Additional recommendations for surveillance/medical management will be made pending genetic test results.

## 2023-12-06 NOTE — PROGRESS NOTES
Pre-Test Genetic Counseling Consult Note    Patient Name: Daria Partida   /Age: [birth year]//48 y.o. Referring Provider:  Jesús Worthy PA-C    Date of Service: 2023  Genetic Counselor: Jarad Eli MS, [facility]  Interpretation Services: None  [facility] [address]else[address] was referred to the [address] [place], [place] [id number] and Genetic Assessment Program due to her family history of colorectal cancer . she presents today to discuss the possibility of a hereditary cancer syndrome, options for genetic testing, and implications for her and her family. Cancer History and Treatment:   Personal History: no personal history of cancer     Screening Hx:   Breast:  Breast Imaging:   Mammogram (2023): Impression: No mammographic evidence of malignancy. Left Breast Biopsy (): benign   Breast Density: Extremely dense    Colon:  Colonoscopy (): 0 polyps reported  F/u recommended in 5 years     Gynecologic:  Ovaries and Uterus intact     Skin:  No current screening    Other screening:   Upper GI:  Endoscopy (): The esophagus appeared normal.   Nodular mucosa in the pylorus; Gastric mapping biopsies obtained  The duodenum appeared normal. Performed random biopsy to rule out celiac disease.      Reproductive History  Age at menarche: 15  Age at first live birth:  21  Menopause: Perimenopausal  Hormone replacement: none     Medical and Surgical History  Pertinent surgical history:   Past Surgical History:   Procedure Laterality Date    BREAST BIOPSY Left     benign    PARATHYROIDECTOMY            Pertinent medical history:  Past Medical History:   Diagnosis Date    Hyperparathyroidism ([address] [facility])     Lupus ([address] [facility])          Other History:  Height:   Ht Readings from Last 1 Encounters:   23 5' 2" (1.575 m)     Weight:   Wt Readings from Last 1 Encounters:   23 51.7 kg (114 lb)       Relevant Family History   Patient reports non-Ashkenazi Church ancestry. Maternal Family History:  Grandmother: leukemia (d.60s)     Paternal Family History:  Father: colorectal cancer diagnosed at [address] (currently 80 y/o)   Uncle: colon cancer diagnosed at unknown age, no info/contact (living)  Uncle: colon cancer diagnosed at unknown age, no info/contact (living)  Aunt: colorectal cancer diagnosed at [address] (currently 76s)  Aunt: colorectal cancer diagnosed at 52 (d.49)  Uncle: colorectal cancer diagnosed at 48 (d.60s)     Jada Collins is not in contact with most relatives on her paternal side and has limited information regarding their family history and structure     Please refer to the scanned pedigree in the Media Tab for a complete family history     *All history is reported as provided by the patient; records are not available for review, except where indicated. Assessment:  We discussed sporadic, familial and hereditary cancer. We reviewed that only 5-10% of cancers are considered hereditary. Cancers such as lung cancer, cervical cancer, testicular cancer, and liver cancer very rarely have a hereditary etiology, and we cannot test for a genetic predisposition for these cancers at this time. We also discussed the many factors that influence our risk for cancer such as age, environmental exposures, lifestyle choices and family history. Jada Collins mentioned that many paternal relatives with colorectal cancer were heavy smokers, drinkers, and followed nutrient-poor diets. We reviewed the indications suggestive of a hereditary predisposition to cancer. We discussed the possibility of Arita syndrome given the number of paternal relatives with colorectal cancer at relatives young ages. We reviewed the cancer risks and NCCN screening recommendations for individuals with Arita syndrome.      Genetic testing is indicated for Rosario based on the following criteria: Meets NCCN V1.2023 Testing Criteria for the Evaluation of Arita syndrome: first-degree relative (father) with colorectal cancer diagnosed <51 y/o. Josselin Monique is unaffected, but is considering genetic testing due to a family history of colorectal cancer. Although Rosario's father is the most informative person to undergo genetic testing, Josselin Monique can consider genetic testing due to the following:   Patient does not have cancer; however, their father has declined testing and   therefore patient is the most informative person for testing. The risks, benefits, and limitations of genetic testing were reviewed with the patient, as well as genetic discrimination laws, and possible test results (positive, negative, variants of uncertain significance) and their clinical implications. If positive for a mutation, options for managing cancer risk including increased surveillance, chemoprevention, and in some cases prophylactic surgery were discussed. Josselin Monique was informed that if a hereditary cancer syndrome was identified in her, first degree relatives (parents, siblings, and children) have a chance of also inheriting the condition. Genetic testing would allow for predictive genetic testing in other relatives, who may also be at risk depending on their degree of relation. Anisha Holm mentioned a personal history of joint hyperflexibility, skin hyperextensibility, and nearsightedness. Her presentation is suspicious for a connective tissue disorder. We discussed the possibility of Bruno Danlos syndrome (EDS). Anisha Holm has reportedly followed up with a cardiologist for assessment of aortic root dilatation. Anisha Holm mentioned that her son is s/p a partial right pneumonectomy secondary to a pneumothorax at 10 y/o. He is reportedly nearsighted and has a tall and lanky stature. We discussed the possibility that her son has Marfan syndrome. We strongly recommended that Anisha Holm and her son follow up with a  and consider genetic testing for connective tissue disorders.   We reviewed that most connective tissue disorders follow autosomal dominant inheritance. Thus, if it were confirmed via molecular testing, her children could pursue genetic testing and follow up with appropriate specialists. Theo Avila understands that there are different types of EDS and molecular confirmation has not been established for all types. We informed Maegan that some types of EDS are associated with a significantly increased risk for uterine rupture during pregnancy. Thus, a genetics evaluation may be beneficial before her children become pregnant. Theo Avila was given the number of the genetics department at [facility]. Theo Avila mentioned that she had direct-to-consumer genetic testing through a [address] [place] [address] that identified an increased lifetime risk for pancreatic cancer. She does not know which genes were included on the test or how the lifetime risk was calculated. We reviewed that cancer susceptibility genes that cause a predisposition to pancreatic cancer will be included on her hereditary cancer testing. Theo Avila understands that if she tests positive for Arita syndrome, she would not qualify for pancreatic cancer screening in the absence of a family history. Theo Avila understands that the false negative and false positive rates among direct-to-consumer genetic testing is high and oftentimes these companies only test for the most common mutations in many genes. Billing:  Most individuals pay <$100 for hereditary cancer genetic testing. If insurance covers the cost of the testing, individuals may still pay out of pocket secondary to co-pays, co-insurance, or deductibles. If the cost of the testing exceeds $100, the lab will reach out to the patient via phone or e-mail. The patient will then have the option to proceed with the testing, cancel the testing, or elect the self-pay option of $250. Rosario verbalized understanding. Plan: Patient decided to proceed with testing and provided consent. Summary:     Sample Collection:   The patient's blood sample was drawn in the office on 12/6/23 by medical assistant Basia Hall    Genetic Testing Preformed: CustomNext: Cancer® +[employer]® (61 genes): APC, GARY, AXIN2, BAP1, BARD1, BMPR1A, BRCA1, BRCA2, BRIP1, CDH1, CDK4, CDKN1B, CDKN2A, CHEK2, CTNNA1, DICER1, EGLN1, EPCAM, FH, FLCN, GREM1, HOXB13, KIF1B, KIT, MAX, MEN1, MET, MITF, MLH1, MSH2, MSH3, MSH6, MUTYH, NBN, NF1, NTHL1, PALB2, PMS2, POLD1, POLE, POT1, PTEN, RAD50, RAD51C, RAD51D, RB1, RECQL, RET, SDHA, SDHAF2, SDHB, SDHC, SDHD, SMAD4, SMARCA4, STK11, XSCA951, TP53, TSC1, TSC2, VHL      Result Call Information:  In the event that we need to reach [facility] via telephone:  I confirmed the patient's mobile number on file as the best number to call with results  I confirmed with the patient that we can leave a voicemail on her mobile number    Results take approximately 2-3 weeks to complete once test is started. [facility] will be notified via Migoat once results are available. Additional recommendations for surveillance/medical management will be made pending genetic test results.

## 2024-02-13 ENCOUNTER — HOSPITAL ENCOUNTER (EMERGENCY)
Facility: HOSPITAL | Age: 49
Discharge: HOME | End: 2024-02-13
Attending: EMERGENCY MEDICINE
Payer: COMMERCIAL

## 2024-02-13 ENCOUNTER — APPOINTMENT (EMERGENCY)
Dept: CT IMAGING | Facility: HOSPITAL | Age: 49
End: 2024-02-13
Payer: COMMERCIAL

## 2024-02-13 VITALS
WEIGHT: 114 LBS | HEIGHT: 62 IN | RESPIRATION RATE: 18 BRPM | TEMPERATURE: 97.9 F | DIASTOLIC BLOOD PRESSURE: 83 MMHG | SYSTOLIC BLOOD PRESSURE: 128 MMHG | BODY MASS INDEX: 20.98 KG/M2 | OXYGEN SATURATION: 97 % | HEART RATE: 69 BPM

## 2024-02-13 DIAGNOSIS — G56.01 CARPAL TUNNEL SYNDROME OF RIGHT WRIST: Primary | ICD-10-CM

## 2024-02-13 PROCEDURE — 96372 THER/PROPH/DIAG INJ SC/IM: CPT

## 2024-02-13 PROCEDURE — 99284 EMERGENCY DEPT VISIT MOD MDM: CPT

## 2024-02-13 PROCEDURE — 99284 EMERGENCY DEPT VISIT MOD MDM: CPT | Performed by: EMERGENCY MEDICINE

## 2024-02-13 PROCEDURE — 72125 CT NECK SPINE W/O DYE: CPT

## 2024-02-13 RX ORDER — KETOROLAC TROMETHAMINE 30 MG/ML
30 INJECTION, SOLUTION INTRAMUSCULAR; INTRAVENOUS ONCE
Status: COMPLETED | OUTPATIENT
Start: 2024-02-13 | End: 2024-02-13

## 2024-02-13 RX ORDER — KETOROLAC TROMETHAMINE 10 MG/1
10 TABLET, FILM COATED ORAL EVERY 6 HOURS PRN
Qty: 8 TABLET | Refills: 0 | Status: SHIPPED | OUTPATIENT
Start: 2024-02-13

## 2024-02-13 RX ADMIN — KETOROLAC TROMETHAMINE 30 MG: 30 INJECTION, SOLUTION INTRAMUSCULAR; INTRAVENOUS at 16:09

## 2024-02-13 NOTE — DISCHARGE INSTRUCTIONS
Use Toradol to the area 4-6 times a day for 10 to 15 minutes at a time.    Consider going to a medical supply store and asking for a carpal tunnel brace.  You can also buy one on Amazon.    I will refer you to orthopedics for an evaluation.  Sometimes its injections of steroids may be helpful or even surgery.  Physical therapy sometimes is also helpful.    Return to the ER for any new, concerning, or worsening issues.    For increasing pain use Toradol 1 pill every 6 hours as needed.  Take with food. Do not take Advil, Motrin, ibuprofen, Aleve, Naprosyn, or aspirin while on Toradol.  You may take Tylenol if necessary.

## 2024-02-13 NOTE — ED PROVIDER NOTES
History  Chief Complaint   Patient presents with    Numbness     Pt reports hand nu,bness for a few months and is worse today. Pt reports that it feels like rubber bands snapping.      Patient reports numbness in her hand for the last few months.  The patient states that when she moves her hand a certain way she feels a twang in her right hand in her palm.  The patient states that the numbness extends from her thenar eminence to the radial aspect of the ring finger.  Patient notes that symptoms appear to be getting worse.  Patient denies pain up her arm or headache.        Prior to Admission Medications   Prescriptions Last Dose Informant Patient Reported? Taking?   famotidine (PEPCID) 40 MG tablet More than a month  No No   Sig: Take 1 tablet (40 mg total) by mouth daily at bedtime      Facility-Administered Medications: None       Past Medical History:   Diagnosis Date    Hyperparathyroidism (HCC)     Lupus (HCC)        Past Surgical History:   Procedure Laterality Date    BREAST BIOPSY Left 2019    benign    PARATHYROIDECTOMY      2020       Family History   Problem Relation Age of Onset    Multiple sclerosis Mother     Colon cancer Father     No Known Problems Daughter     No Known Problems Daughter     No Known Problems Maternal Grandmother     No Known Problems Paternal Grandmother     Colon cancer Paternal Aunt     Colon cancer Paternal Aunt      I have reviewed and agree with the history as documented.    E-Cigarette/Vaping    E-Cigarette Use Never User      E-Cigarette/Vaping Substances     Social History     Tobacco Use    Smoking status: Never    Smokeless tobacco: Never   Vaping Use    Vaping status: Never Used   Substance Use Topics    Alcohol use: Not Currently    Drug use: Never       Review of Systems   Constitutional:  Positive for activity change. Negative for chills and fever.   HENT:  Negative for ear pain and sore throat.    Eyes:  Negative for pain and visual disturbance.   Respiratory:   Negative for cough and shortness of breath.    Cardiovascular:  Negative for chest pain and palpitations.   Gastrointestinal:  Negative for abdominal pain and vomiting.   Genitourinary:  Negative for dysuria and hematuria.   Musculoskeletal:  Negative for arthralgias and back pain.   Skin:  Negative for color change and rash.   Neurological:  Positive for numbness. Negative for seizures, syncope and weakness.   All other systems reviewed and are negative.      Physical Exam  Physical Exam  Vitals and nursing note reviewed.   Constitutional:       General: She is not in acute distress.     Appearance: Normal appearance. She is well-developed.   HENT:      Head: Normocephalic and atraumatic.      Right Ear: External ear normal.      Left Ear: External ear normal.      Nose: Nose normal.      Mouth/Throat:      Mouth: Mucous membranes are moist.   Eyes:      Conjunctiva/sclera: Conjunctivae normal.   Cardiovascular:      Rate and Rhythm: Normal rate and regular rhythm.      Pulses: Normal pulses.      Heart sounds: Normal heart sounds. No murmur heard.  Pulmonary:      Effort: Pulmonary effort is normal. No respiratory distress.      Breath sounds: Normal breath sounds.   Abdominal:      General: Bowel sounds are normal.      Palpations: Abdomen is soft.      Tenderness: There is no abdominal tenderness.   Musculoskeletal:         General: No swelling or deformity.      Cervical back: Neck supple.      Comments: I do not appreciate any thenar or hypothenar wasting.   Skin:     General: Skin is warm and dry.      Capillary Refill: Capillary refill takes less than 2 seconds.   Neurological:      General: No focal deficit present.      Mental Status: She is alert and oriented to person, place, and time. Mental status is at baseline.      Sensory: Sensory deficit present.      Motor: No weakness.      Comments: Patient with increasing numbness along the median nerve distribution of the right hand, around the C7 region.       Positive Tinel and Phalen sign noted.         Vital Signs  ED Triage Vitals   Temperature Pulse Respirations Blood Pressure SpO2   02/13/24 1356 02/13/24 1359 02/13/24 1359 02/13/24 1359 02/13/24 1359   97.9 °F (36.6 °C) 69 18 128/83 97 %      Temp Source Heart Rate Source Patient Position - Orthostatic VS BP Location FiO2 (%)   02/13/24 1356 02/13/24 1359 02/13/24 1359 02/13/24 1359 --   Temporal Monitor Sitting Left arm       Pain Score       02/13/24 1356       4           Vitals:    02/13/24 1359   BP: 128/83   Pulse: 69   Patient Position - Orthostatic VS: Sitting         Visual Acuity      ED Medications  Medications   ketorolac (TORADOL) injection 30 mg (30 mg Intramuscular Given 2/13/24 1609)       Diagnostic Studies  Results Reviewed       None                   CT spine cervical without contrast   Final Result by Zion Joe MD (02/13 1557)      No cervical spine fracture or traumatic malalignment.                  Workstation performed: [id number]                    Procedures  Procedures         ED Course                               SBIRT 20yo+      Flowsheet Row Most Recent Value   Initial Alcohol Screen: US AUDIT-C     1. How often do you have a drink containing alcohol? 0 Filed at: 02/13/2024 1359   2. How many drinks containing alcohol do you have on a typical day you are drinking?  0 Filed at: 02/13/2024 1359   3b. FEMALE Any Age, or MALE 65+: How often do you have 4 or more drinks on one occassion? 0 Filed at: 02/13/2024 1359   Audit-C Score 0 Filed at: 02/13/2024 1359   REGULO: How many times in the past year have you...    Used an illegal drug or used a prescription medication for non-medical reasons? Never Filed at: 02/13/2024 1359                      Medical Decision Making  48-year-old female presents emergency room complaining of right hand numbness for months.  The patient denies headache trauma or fever.  The patient notes that she also feels a pop sensation in her right hand  when she makes certain movements with her wrist and hand.  The patient is right-hand dominant.  Differential diagnosis at the time evaluation is radicular pain, stroke, or upper extremity nerve impingement.  Examining the patient more closely, it appears that the patient's paresthesias are at the C7 level or the median nerve.  There is normal sensation of the fifth digit as well as the ulnar aspect of the fourth digit.  Patient has positive Phalen's and Tinel's sign.  The patient has no evidence of neck pain or headache.  Spurling sign is also negative.  The patient had a CT scan to verify that there is no C7 nerve impingement and this was read as negative.  The patient's symptoms are much more consistent with carpal tunnel.  The patient will be discharged on anti-inflammatories and urged to follow-up with orthopedics if this does not help.  The patient was also urged to get a carpal tunnel brace from a [employer] or [employer].  Patient was told return if worse and discharge.    Amount and/or Complexity of Data Reviewed  Radiology: ordered.    Risk  Prescription drug management.             Disposition  Final diagnoses:   Carpal tunnel syndrome of right wrist     Time reflects when diagnosis was documented in both MDM as applicable and the Disposition within this note       Time User Action Codes Description Comment    2/13/2024  3:09 PM Angel Castillo Add [G56.01] Carpal tunnel syndrome of right wrist           ED Disposition       ED Disposition   Discharge    Condition   Stable    Date/Time   Tue Feb 13, 2024 1604    Comment   Rosario Green discharge to home/self care.                   Follow-up Information    None         Discharge Medication List as of 2/13/2024  4:05 PM        START taking these medications    Details   ketorolac (TORADOL) 10 mg tablet Take 1 tablet (10 mg total) by mouth every 6 (six) hours as needed for moderate pain, Starting Tue 2/13/2024, Normal           CONTINUE these  medications which have NOT CHANGED    Details   famotidine (PEPCID) 40 MG tablet Take 1 tablet (40 mg total) by mouth daily at bedtime, Starting Tue 6/13/2023, Normal             No discharge procedures on file.    PDMP Review       None            ED Provider  Electronically Signed by             Angel Castillo Jr.,   02/13/24 9139

## 2024-03-13 ENCOUNTER — HOSPITAL ENCOUNTER (EMERGENCY)
Facility: HOSPITAL | Age: 49
Discharge: HOME | End: 2024-03-13
Attending: FAMILY MEDICINE | Admitting: FAMILY MEDICINE
Payer: COMMERCIAL

## 2024-03-13 VITALS
BODY MASS INDEX: 20.98 KG/M2 | WEIGHT: 114 LBS | DIASTOLIC BLOOD PRESSURE: 63 MMHG | SYSTOLIC BLOOD PRESSURE: 105 MMHG | HEIGHT: 62 IN | HEART RATE: 75 BPM | OXYGEN SATURATION: 99 % | TEMPERATURE: 98 F | RESPIRATION RATE: 20 BRPM

## 2024-03-13 DIAGNOSIS — J40 BRONCHITIS: Primary | ICD-10-CM

## 2024-03-13 LAB
FLUAV RNA RESP QL NAA+PROBE: NEGATIVE
FLUBV RNA RESP QL NAA+PROBE: POSITIVE
RSV RNA RESP QL NAA+PROBE: NEGATIVE
SARS-COV-2 RNA RESP QL NAA+PROBE: NEGATIVE

## 2024-03-13 PROCEDURE — 99284 EMERGENCY DEPT VISIT MOD MDM: CPT | Performed by: FAMILY MEDICINE

## 2024-03-13 PROCEDURE — 99283 EMERGENCY DEPT VISIT LOW MDM: CPT

## 2024-03-13 PROCEDURE — 0241U HB NFCT DS VIR RESP RNA 4 TRGT: CPT | Performed by: FAMILY MEDICINE

## 2024-03-13 RX ORDER — BENZONATATE 100 MG/1
100 CAPSULE ORAL EVERY 8 HOURS
Qty: 21 CAPSULE | Refills: 0 | Status: SHIPPED | OUTPATIENT
Start: 2024-03-13

## 2024-03-13 RX ORDER — METHYLPREDNISOLONE 4 MG/1
TABLET ORAL
Qty: 21 TABLET | Refills: 0 | Status: SHIPPED | OUTPATIENT
Start: 2024-03-13

## 2024-03-13 RX ORDER — AZITHROMYCIN 250 MG/1
TABLET, FILM COATED ORAL
Qty: 6 TABLET | Refills: 0 | Status: SHIPPED | OUTPATIENT
Start: 2024-03-13 | End: 2024-03-17

## 2024-03-13 NOTE — Clinical Note
Rosario Green was seen and treated in our emergency department on 3/13/2024.                Diagnosis:     Rosario  may return to work on return date.    She may return on this date: 03/16/2024         If you have any questions or concerns, please don't hesitate to call.      Christopher Xavier MD    ______________________________           _______________          _______________  Hospital Representative                              Date                                Time

## 2024-03-13 NOTE — ED PROVIDER NOTES
History  Chief Complaint   Patient presents with    Cough     Pt presents to ER from home for persistent cough since Saturday. Reports mild relief with nyquil at night. 500MG tylenol around 0900 today, no motrin.      HPI  This is a 48-year-old female presented with complaint of productive cough that started since Saturday.  Patient reports some mild fever and mild relief with NyQuil.  Also took some Tylenol without much improvement.  Patient is afebrile in the ED.  Denies any chest pain shortness of breath.  Patient is nontoxic-appearing afebrile in the ED.  Prior to Admission Medications   Prescriptions Last Dose Informant Patient Reported? Taking?   famotidine (PEPCID) 40 MG tablet   No No   Sig: Take 1 tablet (40 mg total) by mouth daily at bedtime   ketorolac (TORADOL) 10 mg tablet   No No   Sig: Take 1 tablet (10 mg total) by mouth every 6 (six) hours as needed for moderate pain      Facility-Administered Medications: None       Past Medical History:   Diagnosis Date    Hyperparathyroidism (HCC)     Lupus (HCC)        Past Surgical History:   Procedure Laterality Date    BREAST BIOPSY Left 2019    benign    PARATHYROIDECTOMY      2020       Family History   Problem Relation Age of Onset    Multiple sclerosis Mother     Colon cancer Father     No Known Problems Daughter     No Known Problems Daughter     No Known Problems Maternal Grandmother     No Known Problems Paternal Grandmother     Colon cancer Paternal Aunt     Colon cancer Paternal Aunt      I have reviewed and agree with the history as documented.    E-Cigarette/Vaping    E-Cigarette Use Never User      E-Cigarette/Vaping Substances     Social History     Tobacco Use    Smoking status: Never    Smokeless tobacco: Never   Vaping Use    Vaping status: Never Used   Substance Use Topics    Alcohol use: Not Currently    Drug use: Never       Review of Systems   Constitutional:  Positive for chills. Negative for fever.   HENT:  Negative for rhinorrhea and  sore throat.    Eyes:  Negative for visual disturbance.   Respiratory:  Positive for cough.    Cardiovascular:  Negative for chest pain and leg swelling.   Gastrointestinal:  Negative for abdominal pain, diarrhea, nausea and vomiting.   Genitourinary:  Negative for dysuria.   Musculoskeletal:  Negative for back pain and myalgias.   Skin:  Negative for rash.   Neurological:  Negative for dizziness and headaches.   Psychiatric/Behavioral:  Negative for confusion.    All other systems reviewed and are negative.      Physical Exam  Physical Exam  Vitals and nursing note reviewed.   Constitutional:       Appearance: She is well-developed.   HENT:      Head: Normocephalic and atraumatic.      Right Ear: External ear normal.      Left Ear: External ear normal.      Nose: Nose normal.      Mouth/Throat:      Mouth: Mucous membranes are moist.      Pharynx: No oropharyngeal exudate.   Eyes:      General: No scleral icterus.        Right eye: No discharge.         Left eye: No discharge.      Conjunctiva/sclera: Conjunctivae normal.      Pupils: Pupils are equal, round, and reactive to light.   Cardiovascular:      Rate and Rhythm: Normal rate and regular rhythm.      Pulses: Normal pulses.      Heart sounds: Normal heart sounds.   Pulmonary:      Effort: Pulmonary effort is normal. No respiratory distress.      Breath sounds: Normal breath sounds. No wheezing.   Abdominal:      General: Bowel sounds are normal.      Palpations: Abdomen is soft.   Musculoskeletal:         General: Normal range of motion.      Cervical back: Normal range of motion and neck supple.   Lymphadenopathy:      Cervical: No cervical adenopathy.   Skin:     General: Skin is warm and dry.      Capillary Refill: Capillary refill takes less than 2 seconds.   Neurological:      General: No focal deficit present.      Mental Status: She is alert and oriented to person, place, and time.   Psychiatric:         Mood and Affect: Mood normal.         Behavior:  Behavior normal.         Vital Signs  ED Triage Vitals [03/13/24 1451]   Temperature Pulse Respirations Blood Pressure SpO2   98 °F (36.7 °C) 75 20 105/63 99 %      Temp Source Heart Rate Source Patient Position - Orthostatic VS BP Location FiO2 (%)   Temporal Monitor Sitting Left arm --      Pain Score       No Pain           Vitals:    03/13/24 1451   BP: 105/63   Pulse: 75   Patient Position - Orthostatic VS: Sitting         Visual Acuity      ED Medications  Medications - No data to display    Diagnostic Studies  Results Reviewed       Procedure Component Value Units Date/Time    FLU/RSV/COVID - if FLU/RSV clinically relevant [[id number]] Collected: 03/13/24 1456    Lab Status: In process Specimen: Nares from Nose Updated: 03/13/24 1459                   No orders to display              Procedures  Procedures         ED Course                               SBIRT 22yo+      Flowsheet Row Most Recent Value   Initial Alcohol Screen: US AUDIT-C     1. How often do you have a drink containing alcohol? 0 Filed at: 03/13/2024 1454   2. How many drinks containing alcohol do you have on a typical day you are drinking?  0 Filed at: 03/13/2024 1454   3b. FEMALE Any Age, or MALE 65+: How often do you have 4 or more drinks on one occassion? 0 Filed at: 03/13/2024 1454   Audit-C Score 0 Filed at: 03/13/2024 1454   REGULO: How many times in the past year have you...    Used an illegal drug or used a prescription medication for non-medical reasons? Never Filed at: 03/13/2024 1454                      Medical Decision Making  Patient with history as above presented with flu like symptoms. History obtained from patient and significant other at bedside  Patient was , stable. Ambulatory. Exam reassuring against focal bacterial infection or surgical pathology.   COVID flu RSV swab.  Patient states that she will wait at home for the swab given patient had current condition  Persistent cough which is productive we will start her on  short course of antibiotics and steroid patient agrees.    Reviewed external records.     Differential diagnosis considered. Overall presentation is consistent with viral infection. Low suspicion for bacterial sinusitis, pneumonia, meningitis, endocarditis, or other serious infection.     Consideration was given for admission, but the patient was stable for outpatient management.     Disposition: Discharged with instructions to obtain outpatient follow up of patient's symptoms and findings, with strict return precautions if patient develops new or worsening symptoms.     Risk  Prescription drug management.             Disposition  Final diagnoses:   Bronchitis     Time reflects when diagnosis was documented in both MDM as applicable and the Disposition within this note       Time User Action Codes Description Comment    3/13/2024  3:02 PM Christopher Xavier Add [J40] Bronchitis           ED Disposition       ED Disposition   Discharge    Condition   Stable    Date/Time   Wed Mar 13, 2024 1502    Comment   Rosario Green discharge to home/self care.                   Follow-up Information       Follow up With Specialties Details Why Contact Info    [facility], Nurse Practitioner Schedule an appointment as soon as possible for a visit in 2 days If symptoms worsen [address] [place] [address]  [phone]              Discharge Medication List as of 3/13/2024  3:04 PM        START taking these medications    Details   azithromycin (ZITHROMAX) 250 mg tablet Take 2 tablets today then 1 tablet daily x 4 days, Normal      benzonatate (TESSALON PERLES) 100 mg capsule Take 1 capsule (100 mg total) by mouth every 8 (eight) hours, Starting Wed 3/13/2024, Normal      methylPREDNISolone 4 MG tablet therapy pack Use as directed on package, Normal           CONTINUE these medications which have NOT CHANGED    Details   famotidine (PEPCID) 40 MG tablet Take 1 tablet (40 mg total) by  mouth daily at bedtime, Starting Tue 6/13/2023, Normal      ketorolac (TORADOL) 10 mg tablet Take 1 tablet (10 mg total) by mouth every 6 (six) hours as needed for moderate pain, Starting Tue 2/13/2024, Normal             No discharge procedures on file.    PDMP Review       None            ED Provider  Electronically Signed by             Christopher Xavier MD  03/13/24 7793

## 2024-03-14 ENCOUNTER — HOSPITAL ENCOUNTER (EMERGENCY)
Facility: HOSPITAL | Age: 49
Discharge: HOME | End: 2024-03-14
Attending: FAMILY MEDICINE | Admitting: EMERGENCY MEDICINE
Payer: COMMERCIAL

## 2024-03-14 ENCOUNTER — APPOINTMENT (EMERGENCY)
Dept: RADIOLOGY | Facility: HOSPITAL | Age: 49
End: 2024-03-14
Payer: COMMERCIAL

## 2024-03-14 VITALS
OXYGEN SATURATION: 99 % | HEART RATE: 78 BPM | TEMPERATURE: 98 F | DIASTOLIC BLOOD PRESSURE: 69 MMHG | RESPIRATION RATE: 20 BRPM | SYSTOLIC BLOOD PRESSURE: 109 MMHG

## 2024-03-14 DIAGNOSIS — J06.9 VIRAL URI WITH COUGH: Primary | ICD-10-CM

## 2024-03-14 PROCEDURE — 99284 EMERGENCY DEPT VISIT MOD MDM: CPT | Performed by: EMERGENCY MEDICINE

## 2024-03-14 PROCEDURE — 71046 X-RAY EXAM CHEST 2 VIEWS: CPT

## 2024-03-14 PROCEDURE — 99283 EMERGENCY DEPT VISIT LOW MDM: CPT

## 2024-03-14 NOTE — ED PROVIDER NOTES
Chief Complaint   Patient presents with    Cough     Pt presents to ER from home for reports of persistent cough despite starting z-cuco script yesterday and taking tessalon perles. Questioned about receiving a chest x-ray.      History of Present Illness and Review of Systems   This is a 48 y.o. female with PMH significant for Lupus coming in today with complaint of cough.  She states she has been having generalized cough and myalgias ongoing for the last 5 days.  She was seen in the emergency department yesterday and diagnosed with influenza.  She has been taking a Z-Cuco and Tessalon Perles without relief.  Denies any productive sputum, headaches, visual changes, chest pain, abdominal pain, for episodes of syncope.  No urinary symptoms.  She has no history of current immunosuppression.  No hemoptysis.  Denies any inability to speak or swallow.  She is tolerating p.o. with liquids.  She does have a family doctor she can follow-up.  No other symptoms currently.    - No language barrier.     No other complaints for this encounter.    Remainder of ROS Reviewed and Non-Pertinent    Past Medical, Past Surgical History:    has a past medical history of Hyperparathyroidism (HCC) and Lupus (HCC).   has a past surgical history that includes Parathyroidectomy and Breast biopsy (Left, 2019).     Allergies:     Allergies   Allergen Reactions    Penicillin G Anaphylaxis    Penicillins Anaphylaxis    Sulfamethoxazole Fever     itching    Ciprofloxacin Itching and Anxiety    Sulfa Antibiotics Rash       Social and Family History:     Social History     Substance and Sexual Activity   Alcohol Use Not Currently     Social History     Tobacco Use   Smoking Status Never   Smokeless Tobacco Never     Social History     Substance and Sexual Activity   Drug Use Never       Physical Examination     Vitals:    03/14/24 1654   BP: 109/69   BP Location: Left arm   Pulse: 78   Resp: 20   Temp: 98 °F (36.7 °C)   TempSrc: Temporal   SpO2: 99%        Physical Exam  Vitals and nursing note reviewed.   Constitutional:       General: She is not in acute distress.     Appearance: She is well-developed.      Comments: Coughing on exam   HENT:      Head: Normocephalic and atraumatic.   Eyes:      Conjunctiva/sclera: Conjunctivae normal.   Cardiovascular:      Rate and Rhythm: Normal rate and regular rhythm.      Heart sounds: No murmur heard.  Pulmonary:      Effort: Pulmonary effort is normal. No respiratory distress.      Breath sounds: Normal breath sounds. No wheezing or rales.   Abdominal:      Palpations: Abdomen is soft.      Tenderness: There is no abdominal tenderness. There is no guarding or rebound.   Musculoskeletal:         General: No swelling.      Cervical back: Neck supple.   Skin:     General: Skin is warm and dry.      Capillary Refill: Capillary refill takes less than 2 seconds.   Neurological:      General: No focal deficit present.      Mental Status: She is alert.   Psychiatric:         Mood and Affect: Mood normal.           Procedures   Procedures      MDM:   Medical Decision Making  Rosario Green is a 48 y.o. who presents with complaints of cough    Vital signs are stable, physical exam shows the patient is coughing on examination however lungs are clear to auscultation, mucous members are moist, heart sounds regular, no abdominal tenderness, no evidence of rash or erythema, skin is warm and dry and well-perfused and no neurodeficits    Dx: Clinically consistent with viral illness, consistent with influenza as she was diagnosed yesterday.  Will evaluate for pneumonia given her protracted symptoms.  No evidence of clinical dehydration.  Also doubtful to be related to SBI based on duration of symptoms and clinical status.    Plan: Chest x-ray, will reassess    Reassessment: CXR negative. No indication for further workup or imaging. Advised on return precautions and recommended close follow up.         Amount and/or Complexity of Data  "Reviewed  Radiology: ordered and independent interpretation performed.        - Reviewed relevant past office visits/hospitalizations/procedures  -Obtained pertinent history that influenced decision making from the spouse        Final Dispo   Final Diagnosis:  1. Viral URI with cough      Time reflects when diagnosis was documented in both MDM as applicable and the Disposition within this note       Time User Action Codes Description Comment    3/14/2024  5:45 PM Douglas Shankar Add [J06.9] Viral URI with cough           ED Disposition       ED Disposition   Discharge    Condition   Stable    Date/Time   u Mar 14, 2024  5:45 PM    Comment   Rosario Green discharge to home/self care.                   Follow-up Information       Follow up With Specialties Details Why Contact Info    [facility], Nurse Practitioner Call   [address] [place] [address]  [address] [place] [address]  [phone]            Medications - No data to display    Risk Stratification Tools                Orders Placed This Encounter   Procedures    XR chest 2 views       Labs:   Labs Reviewed - No data to display    Imaging:     XR chest 2 views   ED Interpretation by Douglas Shankar MD (03/14 5412)   No acute cardiopulmonary abnormality         All details of the evaluation and treatment plan were made clear and additionally all questions and concerns were addressed while under my care.    Portions of the record may have been created with voice recognition software. Occasional wrong word or \"sound a like\" substitutions may have occurred due to the inherent limitations of voice recognition software. Read the chart carefully and recognize, using context, where substitutions have occurred.    The attending physician physically available and evaluated the above patient alongside myself.      Douglas Shankar MD  03/14/24 6340    "

## 2024-03-14 NOTE — DISCHARGE INSTRUCTIONS
Your workup here was not concerning for anything dangerous. Therefore there is no need for you to stay at the hospital for further testing. We feel safe to send you home.    You can use Tylenol, Honey for management of your symptoms.    You should follow up with your PCP to assess for resolution of your symptoms and to determine if there is any further evaluation that needs to be performed.    Return to the emergency department if you have any worsening symptoms.    Thank you for choosing [facility] for your care!

## 2024-03-15 NOTE — ED ATTENDING ATTESTATION
3/14/2024  I, Fidencio William DO, saw and evaluated the patient. I have discussed the patient with the resident/non-physician practitioner and agree with the resident's/non-physician practitioner's findings, Plan of Care, and MDM as documented in the resident's/non-physician practitioner's note, except where noted. All available labs and Radiology studies were reviewed.  I was present for key portions of any procedure(s) performed by the resident/non-physician practitioner and I was immediately available to provide assistance.       At this point I agree with the current assessment done in the Emergency Department.  I have conducted an independent evaluation of this patient a history and physical is as follows:  Well-appearing    ED Course         Critical Care Time  Procedures

## 2024-04-01 ENCOUNTER — OFFICE VISIT (OUTPATIENT)
Dept: FAMILY MEDICINE CLINIC | Facility: CLINIC | Age: 49
End: 2024-04-01
Payer: COMMERCIAL

## 2024-04-01 VITALS
WEIGHT: 119 LBS | DIASTOLIC BLOOD PRESSURE: 70 MMHG | TEMPERATURE: 97.9 F | OXYGEN SATURATION: 98 % | BODY MASS INDEX: 21.9 KG/M2 | SYSTOLIC BLOOD PRESSURE: 110 MMHG | HEIGHT: 62 IN | HEART RATE: 89 BPM

## 2024-04-01 DIAGNOSIS — R53.1 WEAKNESS: ICD-10-CM

## 2024-04-01 DIAGNOSIS — M54.41 ACUTE BILATERAL LOW BACK PAIN WITH BILATERAL SCIATICA: Primary | ICD-10-CM

## 2024-04-01 DIAGNOSIS — M54.42 ACUTE BILATERAL LOW BACK PAIN WITH BILATERAL SCIATICA: Primary | ICD-10-CM

## 2024-04-01 DIAGNOSIS — R20.0 NUMBNESS OF TOES: ICD-10-CM

## 2024-04-01 PROCEDURE — 99214 OFFICE O/P EST MOD 30 MIN: CPT | Performed by: FAMILY MEDICINE

## 2024-04-01 RX ORDER — METHOCARBAMOL 500 MG/1
500 TABLET, FILM COATED ORAL 3 TIMES DAILY PRN
Qty: 20 TABLET | Refills: 0 | Status: SHIPPED | OUTPATIENT
Start: 2024-04-01

## 2024-04-01 NOTE — PROGRESS NOTES
"Assessment/Plan:       Problem List Items Addressed This Visit    None  Visit Diagnoses       Acute bilateral low back pain with bilateral sciatica    -  Primary    Relevant Medications    methocarbamol (ROBAXIN) 500 mg tablet    Other Relevant Orders    MRI lumbar spine wo contrast    Weakness        Relevant Medications    methocarbamol (ROBAXIN) 500 mg tablet    Other Relevant Orders    MRI lumbar spine wo contrast    Numbness of toes        Relevant Medications    methocarbamol (ROBAXIN) 500 mg tablet    Other Relevant Orders    MRI lumbar spine wo contrast              Given new neurologic deficit in addition to severe back pain, recommend MRI lumbar spine. Robaxin provided for PRN use in the meantime, she will contact us if she would like to try prednisone or gabapentin. ED precautions discussed.     Subjective:      Patient ID: Rosario Green is a 49 y.o. female.    HPI    Saturday started with extreme lower back pain, no injury. With use and movement lower back pain is much worse, thighs painful both sides equally, hips both sides. Pain severe, weakness of legs/feel like they're going to give out, big toes numbness both sides. No loss of bladder or bowel control. Aleve and Tylenol, takes edge off.     The following portions of the patient's history were reviewed and updated as appropriate: allergies, current medications, past family history, past medical history, past social history, past surgical history, and problem list.    Review of Systems   All other systems reviewed and are negative.        Objective:      /70   Pulse 89   Temp 97.9 °F (36.6 °C)   Ht 5' 2\" (1.575 m)   Wt 54 kg (119 lb)   SpO2 98%   BMI 21.77 kg/m²          Physical Exam  Vitals reviewed.   Constitutional:       General: She is not in acute distress.     Appearance: Normal appearance. She is not ill-appearing, toxic-appearing or diaphoretic.   Pulmonary:      Effort: Pulmonary effort is normal. No respiratory distress. "   Musculoskeletal:      Lumbar back: Spasms and tenderness present. No swelling, edema, deformity or bony tenderness. Decreased range of motion.   Skin:     General: Skin is warm.      Findings: No erythema or rash.   Neurological:      Mental Status: She is alert and oriented to person, place, and time.      Motor: Weakness present.      Comments: 1st toe bilaterally weakness 4/5 right, 4.5/5 left, numbness both toes to MTP joint.    Psychiatric:         Mood and Affect: Mood normal.         Behavior: Behavior normal.             Jolene Villareal DO  [facility]

## 2024-04-02 ENCOUNTER — PATIENT MESSAGE (OUTPATIENT)
Dept: FAMILY MEDICINE CLINIC | Facility: CLINIC | Age: 49
End: 2024-04-02

## 2024-04-02 DIAGNOSIS — M54.41 ACUTE BILATERAL LOW BACK PAIN WITH BILATERAL SCIATICA: Primary | ICD-10-CM

## 2024-04-02 DIAGNOSIS — M54.42 ACUTE BILATERAL LOW BACK PAIN WITH BILATERAL SCIATICA: Primary | ICD-10-CM

## 2024-04-03 RX ORDER — GABAPENTIN 100 MG/1
100 CAPSULE ORAL
Qty: 30 CAPSULE | Refills: 1 | Status: SHIPPED | OUTPATIENT
Start: 2024-04-03

## 2024-04-10 ENCOUNTER — HOSPITAL ENCOUNTER (OUTPATIENT)
Dept: MRI IMAGING | Facility: HOSPITAL | Age: 49
Discharge: HOME | End: 2024-04-10
Payer: COMMERCIAL

## 2024-04-10 DIAGNOSIS — M54.41 ACUTE BILATERAL LOW BACK PAIN WITH BILATERAL SCIATICA: ICD-10-CM

## 2024-04-10 DIAGNOSIS — M54.42 ACUTE BILATERAL LOW BACK PAIN WITH BILATERAL SCIATICA: ICD-10-CM

## 2024-04-10 DIAGNOSIS — R20.0 NUMBNESS OF TOES: ICD-10-CM

## 2024-04-10 DIAGNOSIS — R53.1 WEAKNESS: ICD-10-CM

## 2024-04-10 PROCEDURE — 72148 MRI LUMBAR SPINE W/O DYE: CPT

## 2024-04-23 DIAGNOSIS — M54.9 BACK PAIN, UNSPECIFIED BACK LOCATION, UNSPECIFIED BACK PAIN LATERALITY, UNSPECIFIED CHRONICITY: Primary | ICD-10-CM

## 2024-12-16 ENCOUNTER — HOSPITAL ENCOUNTER (EMERGENCY)
Facility: HOSPITAL | Age: 49
Discharge: HOME | End: 2024-12-16
Attending: INTERNAL MEDICINE
Payer: COMMERCIAL

## 2024-12-16 ENCOUNTER — APPOINTMENT (EMERGENCY)
Dept: RADIOLOGY | Facility: HOSPITAL | Age: 49
End: 2024-12-16
Payer: COMMERCIAL

## 2024-12-16 VITALS
SYSTOLIC BLOOD PRESSURE: 128 MMHG | DIASTOLIC BLOOD PRESSURE: 67 MMHG | TEMPERATURE: 97.2 F | OXYGEN SATURATION: 99 % | HEART RATE: 76 BPM | RESPIRATION RATE: 16 BRPM

## 2024-12-16 DIAGNOSIS — S16.1XXA STRAIN OF NECK MUSCLE, INITIAL ENCOUNTER: Primary | ICD-10-CM

## 2024-12-16 PROCEDURE — 99284 EMERGENCY DEPT VISIT MOD MDM: CPT | Performed by: INTERNAL MEDICINE

## 2024-12-16 PROCEDURE — 99283 EMERGENCY DEPT VISIT LOW MDM: CPT

## 2024-12-16 PROCEDURE — 72040 X-RAY EXAM NECK SPINE 2-3 VW: CPT

## 2024-12-16 PROCEDURE — 96372 THER/PROPH/DIAG INJ SC/IM: CPT

## 2024-12-16 RX ORDER — NAPROXEN 500 MG/1
500 TABLET ORAL 2 TIMES DAILY WITH MEALS
Qty: 30 TABLET | Refills: 0 | Status: SHIPPED | OUTPATIENT
Start: 2024-12-16

## 2024-12-16 RX ORDER — KETOROLAC TROMETHAMINE 30 MG/ML
15 INJECTION, SOLUTION INTRAMUSCULAR; INTRAVENOUS ONCE
Status: COMPLETED | OUTPATIENT
Start: 2024-12-16 | End: 2024-12-16

## 2024-12-16 RX ORDER — CYCLOBENZAPRINE HCL 10 MG
10 TABLET ORAL 2 TIMES DAILY PRN
Qty: 20 TABLET | Refills: 0 | Status: SHIPPED | OUTPATIENT
Start: 2024-12-16

## 2024-12-16 RX ADMIN — KETOROLAC TROMETHAMINE 15 MG: 30 INJECTION INTRAMUSCULAR; INTRAVENOUS at 16:46

## 2024-12-16 NOTE — ED PROVIDER NOTES
ED Disposition       None          Assessment & Plan       Medical Decision Making  Cervical pain and strain, with some muscle spasm patient 75 to 80% better after just Toradol.  Reviewed exercises for range of motion, and will prescribe naproxen and Flexeril as an outpatient patient is a follow-up appointment in January the PCP.    Amount and/or Complexity of Data Reviewed  Radiology: ordered and independent interpretation performed.    Risk  Prescription drug management.             Medications - No data to display    ED Risk Strat Scores                                              History of Present Illness       No chief complaint on file.      Past Medical History:   Diagnosis Date    Hyperparathyroidism (HCC)     Lupus (HCC)       Past Surgical History:   Procedure Laterality Date    BREAST BIOPSY Left 2019    benign    PARATHYROIDECTOMY      2020      Family History   Problem Relation Age of Onset    Multiple sclerosis Mother     Colon cancer Father     No Known Problems Daughter     No Known Problems Daughter     No Known Problems Maternal Grandmother     No Known Problems Paternal Grandmother     Colon cancer Paternal Aunt     Colon cancer Paternal Aunt       Social History     Tobacco Use    Smoking status: Never    Smokeless tobacco: Never   Vaping Use    Vaping status: Never Used   Substance Use Topics    Alcohol use: Not Currently    Drug use: Never      E-Cigarette/Vaping    E-Cigarette Use Never User       E-Cigarette/Vaping Substances    Nicotine No     THC No     CBD No     Flavoring No     Other No     Unknown No       I have reviewed and agree with the history as documented.     49-year-old female with complaining of cervical pain or neck pain which she has had for a while however over the past week has gotten progressively worse.  Patient states she cannot turn her head completely to the left, has pain when she lifts her arm up at about 45 degrees or tries to reach behind her self she has  spasm sharp stabbing pain.  She has had no injury or trauma to this area.  She said no recent fall or injury, has not done any recent heavy labor.  There is no obvious trigger.  Patient's last menstrual period was approximately 7 months ago, she states she is perimenopausal.        Review of Systems   Constitutional: Negative.    Respiratory: Negative.     Cardiovascular: Negative.    Gastrointestinal: Negative.    Genitourinary: Negative.    Musculoskeletal:  Positive for neck pain and neck stiffness. Negative for arthralgias, back pain, gait problem, joint swelling and myalgias.   Skin: Negative.    Neurological: Negative.    Hematological: Negative.    Psychiatric/Behavioral: Negative.             Objective       ED Triage Vitals   Temp Pulse BP Resp SpO2 Patient Position - Orthostatic VS   -- -- -- -- -- --      Temp src Heart Rate Source BP Location FiO2 (%) Pain Score    -- -- -- -- --      Vitals    None         Physical Exam  Vitals and nursing note reviewed. Exam conducted with a chaperone present (spouse).   Constitutional:       General: She is not in acute distress.     Appearance: Normal appearance. She is not ill-appearing.   HENT:      Head: Normocephalic and atraumatic.   Eyes:      Extraocular Movements: Extraocular movements intact.      Conjunctiva/sclera: Conjunctivae normal.   Neck:      Comments: Patient is point tender left trapezius with increased tension and spasm, most tender point is actually intrascapular left-sided.  Patient is able to flex head completely extend head completely on extension she does have some pain she has full range of motion to the right but however to the left she can only go about 70%, she has normal mass tone sensation grasps are equal bilaterally DTRs are equal.  Cardiovascular:      Rate and Rhythm: Normal rate and regular rhythm.      Pulses: Normal pulses.   Pulmonary:      Effort: Pulmonary effort is normal.      Breath sounds: Normal breath sounds.    Abdominal:      General: Abdomen is flat. Bowel sounds are normal.      Palpations: Abdomen is soft.   Musculoskeletal:         General: Normal range of motion.      Cervical back: Tenderness present.   Lymphadenopathy:      Cervical: No cervical adenopathy.   Skin:     General: Skin is warm and dry.   Neurological:      General: No focal deficit present.      Mental Status: She is alert and oriented to person, place, and time. Mental status is at baseline.   Psychiatric:         Mood and Affect: Mood normal.         Behavior: Behavior normal.         Thought Content: Thought content normal.         Judgment: Judgment normal.         Results Reviewed       None            No orders to display       Procedures    ED Medication and Procedure Management   Prior to Admission Medications   Prescriptions Last Dose Informant Patient Reported? Taking?   gabapentin (Neurontin) 100 mg capsule   No No   Sig: Take 1 capsule (100 mg total) by mouth daily at bedtime   methocarbamol (ROBAXIN) 500 mg tablet   No No   Sig: Take 1 tablet (500 mg total) by mouth 3 (three) times a day as needed for muscle spasms      Facility-Administered Medications: None     Patient's Medications   Discharge Prescriptions    No medications on file     No discharge procedures on file.  ED SEPSIS DOCUMENTATION            Vasiliy Jones MD  12/16/24 [id number]

## 2025-01-06 ENCOUNTER — OFFICE VISIT (OUTPATIENT)
Dept: FAMILY MEDICINE CLINIC | Facility: CLINIC | Age: 50
End: 2025-01-06
Payer: COMMERCIAL

## 2025-01-06 VITALS
BODY MASS INDEX: 21.35 KG/M2 | RESPIRATION RATE: 18 BRPM | WEIGHT: 116 LBS | OXYGEN SATURATION: 99 % | TEMPERATURE: 98.7 F | HEART RATE: 78 BPM | DIASTOLIC BLOOD PRESSURE: 68 MMHG | HEIGHT: 62 IN | SYSTOLIC BLOOD PRESSURE: 118 MMHG

## 2025-01-06 DIAGNOSIS — M21.611 BUNION OF RIGHT FOOT: Primary | ICD-10-CM

## 2025-01-06 DIAGNOSIS — Z12.31 SCREENING MAMMOGRAM FOR BREAST CANCER: ICD-10-CM

## 2025-01-06 DIAGNOSIS — M54.41 ACUTE BILATERAL LOW BACK PAIN WITH BILATERAL SCIATICA: ICD-10-CM

## 2025-01-06 DIAGNOSIS — R20.0 NUMBNESS OF TOES: ICD-10-CM

## 2025-01-06 DIAGNOSIS — R53.1 WEAKNESS: ICD-10-CM

## 2025-01-06 DIAGNOSIS — Z01.419 ENCOUNTER FOR WELL WOMAN EXAM WITH ROUTINE GYNECOLOGICAL EXAM: ICD-10-CM

## 2025-01-06 DIAGNOSIS — M54.42 ACUTE BILATERAL LOW BACK PAIN WITH BILATERAL SCIATICA: ICD-10-CM

## 2025-01-06 PROCEDURE — 99214 OFFICE O/P EST MOD 30 MIN: CPT | Performed by: NURSE PRACTITIONER

## 2025-01-06 RX ORDER — METHOCARBAMOL 500 MG/1
500 TABLET, FILM COATED ORAL 2 TIMES DAILY PRN
Qty: 60 TABLET | Refills: 0 | Status: SHIPPED | OUTPATIENT
Start: 2025-01-06

## 2025-01-06 RX ORDER — GABAPENTIN 100 MG/1
100 CAPSULE ORAL
Qty: 30 CAPSULE | Refills: 1 | Status: SHIPPED | OUTPATIENT
Start: 2025-01-06

## 2025-01-06 NOTE — PROGRESS NOTES
"Name: Rosario Green      : 1975      MRN: [id number]  Encounter Provider: NALINI Liriano  Encounter Date: 2025   Encounter department: [facility]  :  Assessment & Plan  Bunion of right foot    Orders:    Ambulatory Referral to Podiatry; Future    Screening mammogram for breast cancer    Orders:    Mammo screening bilateral w 3d and cad; Future    Encounter for well woman exam with routine gynecological exam    Orders:    Ambulatory Referral to Obstetrics / Gynecology; Future    Acute bilateral low back pain with bilateral sciatica    Orders:    methocarbamol (ROBAXIN) 500 mg tablet; Take 1 tablet (500 mg total) by mouth 2 (two) times a day as needed for muscle spasms    gabapentin (Neurontin) 100 mg capsule; Take 1 capsule (100 mg total) by mouth daily at bedtime           History of Present Illness     Patient presents for an acute visit due to left neck and shoulder pain, she feels this has been ongoing and this is a flare.   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   HENT:  Negative for ear pain and sore throat.    Eyes:  Negative for pain and visual disturbance.   Respiratory:  Negative for cough and shortness of breath.    Cardiovascular:  Negative for chest pain and palpitations.   Gastrointestinal:  Negative for abdominal pain and vomiting.   Genitourinary:  Negative for dysuria and hematuria.   Musculoskeletal:  Positive for arthralgias, gait problem, myalgias, neck pain and neck stiffness. Negative for back pain.   Skin:  Negative for color change and rash.   Neurological:  Negative for seizures and syncope.   All other systems reviewed and are negative.      Objective   /68   Pulse 78   Temp 98.7 °F (37.1 °C) (Temporal)   Resp 18   Ht 5' 2\" (1.575 m)   Wt 52.6 kg (116 lb)   SpO2 99%   BMI 21.22 kg/m² "      Physical Exam  Vitals and nursing note reviewed.   Constitutional:       General: She is not in acute distress.     Appearance: She is well-developed.   HENT:      Head: Normocephalic and atraumatic.   Eyes:      Conjunctiva/sclera: Conjunctivae normal.   Neck:      Comments: Decreased rom noted   Cardiovascular:      Rate and Rhythm: Normal rate and regular rhythm.      Heart sounds: No murmur heard.  Pulmonary:      Effort: Pulmonary effort is normal. No respiratory distress.      Breath sounds: Normal breath sounds.   Abdominal:      Palpations: Abdomen is soft.      Tenderness: There is no abdominal tenderness.   Musculoskeletal:         General: Swelling and deformity present.      Cervical back: Neck supple.      Comments: Bunion present    Skin:     General: Skin is warm and dry.      Capillary Refill: Capillary refill takes less than 2 seconds.   Neurological:      Mental Status: She is alert and oriented to person, place, and time.   Psychiatric:         Mood and Affect: Mood normal.         Behavior: Behavior normal.         Thought Content: Thought content normal.         Judgment: Judgment normal.

## 2025-01-07 ENCOUNTER — TELEPHONE (OUTPATIENT)
Age: 50
End: 2025-01-07

## 2025-01-07 NOTE — TELEPHONE ENCOUNTER
FYI:  Patient stated she just missed a call from the [facility] number but she is not a patient and does not have a referral for ENT. I checked her chart and there is no indication that a call was made to her number.

## 2025-01-16 ENCOUNTER — HOSPITAL ENCOUNTER (OUTPATIENT)
Dept: MAMMOGRAPHY | Facility: HOSPITAL | Age: 50
End: 2025-01-16
Payer: COMMERCIAL

## 2025-01-16 DIAGNOSIS — Z12.31 SCREENING MAMMOGRAM FOR BREAST CANCER: ICD-10-CM

## 2025-01-16 PROCEDURE — 77063 BREAST TOMOSYNTHESIS BI: CPT

## 2025-01-16 PROCEDURE — 77067 SCR MAMMO BI INCL CAD: CPT

## 2025-01-17 ENCOUNTER — APPOINTMENT (OUTPATIENT)
Dept: RADIOLOGY | Facility: CLINIC | Age: 50
End: 2025-01-17
Payer: COMMERCIAL

## 2025-01-17 ENCOUNTER — CONSULT (OUTPATIENT)
Dept: PAIN MEDICINE | Facility: CLINIC | Age: 50
End: 2025-01-17
Payer: COMMERCIAL

## 2025-01-17 VITALS — BODY MASS INDEX: 21.35 KG/M2 | WEIGHT: 116 LBS | HEIGHT: 62 IN

## 2025-01-17 DIAGNOSIS — M54.2 NECK PAIN: ICD-10-CM

## 2025-01-17 DIAGNOSIS — G89.29 CHRONIC LEFT SHOULDER PAIN: Primary | ICD-10-CM

## 2025-01-17 DIAGNOSIS — G89.29 CHRONIC LEFT SHOULDER PAIN: ICD-10-CM

## 2025-01-17 DIAGNOSIS — M25.512 CHRONIC LEFT SHOULDER PAIN: Primary | ICD-10-CM

## 2025-01-17 DIAGNOSIS — M75.52 SUBACROMIAL BURSITIS OF LEFT SHOULDER JOINT: ICD-10-CM

## 2025-01-17 DIAGNOSIS — M25.512 CHRONIC LEFT SHOULDER PAIN: ICD-10-CM

## 2025-01-17 DIAGNOSIS — M79.18 MYOFASCIAL PAIN SYNDROME: ICD-10-CM

## 2025-01-17 PROCEDURE — 99244 OFF/OP CNSLTJ NEW/EST MOD 40: CPT | Performed by: STUDENT IN AN ORGANIZED HEALTH CARE EDUCATION/TRAINING PROGRAM

## 2025-01-17 PROCEDURE — 73030 X-RAY EXAM OF SHOULDER: CPT

## 2025-01-17 NOTE — PROGRESS NOTES
"Assessment:  1. Chronic left shoulder pain    2. Subacromial bursitis of left shoulder joint    3. Neck pain    4. Myofascial pain syndrome        Portions of the record may have been created with voice recognition software. Occasional wrong word or \"sound a like\" substitutions may have occurred due to the inherent limitations of voice recognition software. Read the chart carefully and recognize, using context, where substitutions have occurred. Contact me with any questions.       Plan:  49-year-old female referred by PCP, here for initial evaluation of chronic left shoulder pain.  Notes pain noted difficulty with LUE function, denies new or progressive weakness, balance or gait issues.  She notes intermittent radiation over proximal LUE.  Denies paresthesias in LUE.  Cervical spine imaging reviewed and shows multilevel DDD.  She has been taking Robaxin 500 mg as needed, gabapentin 100 mg nightly, and states with limited relief.  Chronic left-sided shoulder pain likely in the setting of subacromial bursitis versus cervical radiculopathy.    Obtain left shoulder x-ray for further evaluation.    Recommend course of physical therapy for optimizing pain-free ROM, strengthening.    Follow-up in 6 to 8 weeks or as needed.  If symptoms persist or worsen, consider left subacromial bursa injection.        Complete risks and benefits including bleeding, infection, tissue reaction, nerve injury and allergic reaction were discussed. The approach was demonstrated using models and literature was provided. Verbal and written consent was obtained.      History of Present Illness:    The patient is a 49 y.o. female who presents for consultation in regards to Shoulder Pain (left).  Symptoms have been present for over 3 years. Symptoms began without any precipitating injury or trauma. Pain is reported to be 8 on the numeric rating scale.  Symptoms are felt constantly and worst in the morning.  Symptoms are characterized as burning, " cramping, sharp, and dull/aching.  Symptoms are associated with pain limited difficulty with LUE function, ROM.  Aggravating factors include lying down, bending, exercise, and relaxation.  Relieving factors include standing.        Review of Systems:    Review of Systems   Constitutional:  Negative for chills and fever.   HENT:  Negative for ear pain, sinus pressure and sore throat.    Eyes:  Negative for pain and redness.   Respiratory:  Negative for cough and wheezing.    Cardiovascular:  Negative for leg swelling.   Gastrointestinal:  Negative for abdominal pain and nausea.   Endocrine: Negative for polydipsia and polyuria.   Genitourinary:  Negative for difficulty urinating and frequency.   Musculoskeletal:  Positive for arthralgias, joint swelling and myalgias. Negative for gait problem.   Skin:  Negative for pallor and wound.   Neurological:  Negative for seizures, weakness and headaches.   Psychiatric/Behavioral:  Negative for decreased concentration and sleep disturbance.    All other systems reviewed and are negative.          Past Medical History:   Diagnosis Date    Hyperparathyroidism (HCC)     Lupus        Past Surgical History:   Procedure Laterality Date    BREAST BIOPSY Left 2019    benign    PARATHYROIDECTOMY      2020       Family History   Problem Relation Age of Onset    Multiple sclerosis Mother     Colon cancer Father     No Known Problems Daughter     No Known Problems Daughter     No Known Problems Maternal Grandmother     No Known Problems Paternal Grandmother     Colon cancer Paternal Aunt     Colon cancer Paternal Aunt        Social History     Occupational History    Not on file   Tobacco Use    Smoking status: Never    Smokeless tobacco: Never   Vaping Use    Vaping status: Never Used   Substance and Sexual Activity    Alcohol use: Not Currently    Drug use: Never    Sexual activity: Not Currently         Current Outpatient Medications:     gabapentin (Neurontin) 100 mg capsule, Take 1  "capsule (100 mg total) by mouth daily at bedtime, Disp: 30 capsule, Rfl: 1    methocarbamol (ROBAXIN) 500 mg tablet, Take 1 tablet (500 mg total) by mouth 2 (two) times a day as needed for muscle spasms, Disp: 60 tablet, Rfl: 0    naproxen (Naprosyn) 500 mg tablet, Take 1 tablet (500 mg total) by mouth 2 (two) times a day with meals, Disp: 30 tablet, Rfl: 0    cyclobenzaprine (FLEXERIL) 10 mg tablet, Take 1 tablet (10 mg total) by mouth 2 (two) times a day as needed for muscle spasms (Patient not taking: Reported on 1/6/2025), Disp: 20 tablet, Rfl: 0    Allergies   Allergen Reactions    Penicillin G Anaphylaxis    Penicillins Anaphylaxis    Sulfamethoxazole Fever     itching    Ciprofloxacin Itching and Anxiety    Sulfa Antibiotics Rash       Physical Exam:    Ht 5' 2\" (1.575 m)   Wt 52.6 kg (116 lb)   BMI 21.22 kg/m²     Constitutional: normal, well developed, well nourished, alert, in no distress and non-toxic and no overt pain behavior.  Eyes: anicteric  HEENT: grossly intact  Neck: supple, symmetric, trachea midline and no masses   Pulmonary:even and unlabored  Cardiovascular:No edema or pitting edema present  Skin:Normal without rashes or lesions and well hydrated  Psychiatric:Mood and affect appropriate  Neurologic:Cranial Nerves II-XII grossly intact  Musculoskeletal:normal gait, pain to palpation over L subacromial bursa, pain with end range passive and active shoulder extension/abduction, IR/ER, grossly intact strength and sensation to light touch over BUE, negative bush's        Imaging    Narrative & Impression   XR SPINE CERVICAL 2 OR 3 VW INJURY     INDICATION: Pain with decreased range of motion.     COMPARISON: Cervical spine radiographs 6/7/2023     FINDINGS:     No acute fracture or subluxation.     Straightening of the usual cervical lordosis, similar.     Tiny osteophytes at C5 and C6. Mild disc height loss at C5-6 and C6-7. Mild multilevel facet and uncovertebral degenerative disease.     No " prevertebral soft tissue swelling. Left parathyroidectomy clips in place.     Clear lung apices.     IMPRESSION:     No acute osseous abnormality.     Degenerative changes as described.     Straightening of the usual cervical lordosis, similar.          Study Result    Narrative & Impression   MRI LUMBAR SPINE WITHOUT CONTRAST     INDICATION: M54.42: Lumbago with sciatica, left side  M54.41: Lumbago with sciatica, right side  R53.1: Weakness  R20.0: Anesthesia of skin.     COMPARISON:  None.     TECHNIQUE:  Multiplanar, multisequence imaging of the lumbar spine was performed. .        IMAGE QUALITY:  Diagnostic     FINDINGS:     VERTEBRAL BODIES:  There are 5 lumbar type vertebral bodies.  Normal alignment of the lumbar spine.  No spondylolysis or spondylolisthesis. No scoliosis.  No compression fracture.    Normal marrow signal is identified within the visualized bony   structures.  No discrete marrow lesion.     SACRUM:  Normal signal within the sacrum. No evidence of insufficiency or stress fracture.     DISTAL CORD AND CONUS:  Normal size and signal within the distal cord and conus. The conus terminates at the L1 level.  The cauda equina nerve roots appear within normal limits.     PARASPINAL SOFT TISSUES:  Paraspinal soft tissues are unremarkable.     LOWER THORACIC DISC SPACES:  Normal disc height and signal.  No disc herniation, canal stenosis or foraminal narrowing.     LUMBAR DISC SPACES:     L1-2 - L3-4: No focal disc herniation, central canal stenosis, or neural foraminal narrowing.     L4-5: Mild diffuse disc bulge extending into the foraminal regions. Bilateral ligamentum flavum and facet hypertrophy.  No central canal stenosis. Mild bilateral subarticular/lateral recess narrowing. Mild bilateral neural foraminal stenosis.     L5-S1: Mild diffuse disc bulge with a superimposed small broad-based central disc protrusion.  No central canal stenosis. Mild bilateral subarticular/lateral recess narrowing.  Mild bilateral neural foraminal stenosis.     OTHER FINDINGS:  None.     IMPRESSION:     Mild degenerative disc disease at the L4-5 and L5-S1 levels without central canal narrowing. Mild bilateral L4-5 and L5-S1 neural foraminal narrowing.       CT CERVICAL SPINE - WITHOUT CONTRAST     INDICATION:   PAresthesias.     COMPARISON:  None.     TECHNIQUE:  CT examination of the cervical spine was performed without intravenous contrast.  Contiguous axial images were obtained. Multiplanar 2D reformatted images were created from the source data.     Radiation dose length product (DLP) for this visit:  318.5 mGy-cm .  This examination, like all CT scans performed in the [facility], was performed utilizing techniques to minimize radiation dose exposure, including the use of iterative   reconstruction and automated exposure control.     IMAGE QUALITY:  Diagnostic.     FINDINGS:     ALIGNMENT:  Normal alignment of the cervical spine. No subluxation.     VERTEBRAE:  No fracture.     DEGENERATIVE CHANGES:  Mild multilevel cervical degenerative changes are noted without critical central canal stenosis.     PREVERTEBRAL AND PARASPINAL SOFT TISSUES: Unremarkable     THORACIC INLET:  Normal.     IMPRESSION:     No cervical spine fracture or traumatic malalignment.       Orders Placed This Encounter   Procedures    XR shoulder 2+ vw left    Ambulatory referral to Physical Therapy

## 2025-01-20 ENCOUNTER — RESULTS FOLLOW-UP (OUTPATIENT)
Dept: PAIN MEDICINE | Facility: CLINIC | Age: 50
End: 2025-01-20

## 2025-01-24 DIAGNOSIS — M25.512 CHRONIC LEFT SHOULDER PAIN: Primary | ICD-10-CM

## 2025-01-24 DIAGNOSIS — G89.29 CHRONIC LEFT SHOULDER PAIN: Primary | ICD-10-CM

## 2025-01-24 RX ORDER — METHYLPREDNISOLONE 4 MG/1
TABLET ORAL
Qty: 1 EACH | Refills: 0 | Status: SHIPPED | OUTPATIENT
Start: 2025-01-24

## 2025-01-27 DIAGNOSIS — Z00.6 ENCOUNTER FOR EXAMINATION FOR NORMAL COMPARISON OR CONTROL IN CLINICAL RESEARCH PROGRAM: ICD-10-CM

## 2025-02-10 ENCOUNTER — PATIENT MESSAGE (OUTPATIENT)
Dept: PAIN MEDICINE | Facility: CLINIC | Age: 50
End: 2025-02-10

## 2025-02-10 ENCOUNTER — HOSPITAL ENCOUNTER (EMERGENCY)
Facility: HOSPITAL | Age: 50
Discharge: HOME | End: 2025-02-10
Attending: EMERGENCY MEDICINE
Payer: COMMERCIAL

## 2025-02-10 VITALS
TEMPERATURE: 97.6 F | SYSTOLIC BLOOD PRESSURE: 108 MMHG | DIASTOLIC BLOOD PRESSURE: 67 MMHG | HEART RATE: 59 BPM | RESPIRATION RATE: 16 BRPM | OXYGEN SATURATION: 97 %

## 2025-02-10 DIAGNOSIS — M54.12 LEFT CERVICAL RADICULOPATHY: Primary | ICD-10-CM

## 2025-02-10 DIAGNOSIS — M54.12 CERVICAL RADICULITIS: Primary | ICD-10-CM

## 2025-02-10 DIAGNOSIS — M54.12 LEFT CERVICAL RADICULOPATHY: ICD-10-CM

## 2025-02-10 PROCEDURE — 99284 EMERGENCY DEPT VISIT MOD MDM: CPT

## 2025-02-10 PROCEDURE — 99283 EMERGENCY DEPT VISIT LOW MDM: CPT

## 2025-02-10 PROCEDURE — 96372 THER/PROPH/DIAG INJ SC/IM: CPT

## 2025-02-10 PROCEDURE — 93005 ELECTROCARDIOGRAM TRACING: CPT

## 2025-02-10 RX ORDER — LIDOCAINE 50 MG/G
1 PATCH TOPICAL DAILY
Qty: 14 PATCH | Refills: 0 | Status: SHIPPED | OUTPATIENT
Start: 2025-02-10

## 2025-02-10 RX ORDER — PREDNISONE 20 MG/1
40 TABLET ORAL DAILY
Qty: 8 TABLET | Refills: 0 | Status: SHIPPED | OUTPATIENT
Start: 2025-02-10 | End: 2025-02-14

## 2025-02-10 RX ORDER — LIDOCAINE 50 MG/G
1 PATCH TOPICAL ONCE
Status: DISCONTINUED | OUTPATIENT
Start: 2025-02-10 | End: 2025-02-10 | Stop reason: HOSPADM

## 2025-02-10 RX ORDER — ACETAMINOPHEN 325 MG/1
650 TABLET ORAL ONCE
Status: COMPLETED | OUTPATIENT
Start: 2025-02-10 | End: 2025-02-10

## 2025-02-10 RX ORDER — GABAPENTIN 300 MG/1
CAPSULE ORAL
Qty: 90 CAPSULE | Refills: 0 | Status: SHIPPED | OUTPATIENT
Start: 2025-02-10

## 2025-02-10 RX ORDER — PREDNISONE 20 MG/1
40 TABLET ORAL ONCE
Status: COMPLETED | OUTPATIENT
Start: 2025-02-10 | End: 2025-02-10

## 2025-02-10 RX ORDER — KETOROLAC TROMETHAMINE 30 MG/ML
30 INJECTION, SOLUTION INTRAMUSCULAR; INTRAVENOUS ONCE
Status: COMPLETED | OUTPATIENT
Start: 2025-02-10 | End: 2025-02-10

## 2025-02-10 RX ADMIN — KETOROLAC TROMETHAMINE 30 MG: 30 INJECTION, SOLUTION INTRAMUSCULAR; INTRAVENOUS at 16:01

## 2025-02-10 RX ADMIN — PREDNISONE 40 MG: 20 TABLET ORAL at 16:00

## 2025-02-10 RX ADMIN — LIDOCAINE 1 PATCH: 50 PATCH CUTANEOUS at 16:03

## 2025-02-10 RX ADMIN — ACETAMINOPHEN 650 MG: 325 TABLET ORAL at 16:00

## 2025-02-10 NOTE — ED PROVIDER NOTES
Time reflects when diagnosis was documented in both MDM as applicable and the Disposition within this note       Time User Action Codes Description Comment    2/10/2025  3:57 PM Akin Gutierrez Add [M54.12] Left cervical radiculopathy           ED Disposition       ED Disposition   Discharge    Condition   Stable    Date/Time   Mon Feb 10, 2025  3:57 PM    Comment   Rosario Peter discharge to home/self care.                   Assessment & Plan       Medical Decision Making  Patient is a well-appearing 49-year-old female with history of chronic left shoulder pain and neck strain presenting with left-sided neck pain radiating into her left shoulder and left upper arm. Patient reports this is not her first time here and she follows with spine and pain and has had worsening pain in her left neck and left shoulder for almost 2 months now. She reports difficulty raising her left arm and brushing her hair and is scheduled for physical therapy and a cervical MRI on Tuesday. She woke up with worsening pain this morning so she presents to the emergency room. She called her pain management doctor today who prescribed her gabapentin which she did not  yet and ordered the cervical MRI for next week. I reviewed her recent left shoulder imaging and of her cervical spine CT from last year (see ED course) which shows mild multilevel cervical degenerative changes and this is consistent with her exam. No red flag symptoms other than recent steroid use (Medrol Dosepak). No trauma, falls, F/C, paresthesias, IVDU, or cancer history. Will obtain ECG due to left shoulder and arm pain in a female and no recents ECGs. Discussed the need for labs and imaging with patient and  but we will defer at this time. She needs the MRI that is ordered for next week. Nothing to warrant this today. Will treat her pain in the ED with Toradol shot, Tylenol, lidocaine patch, and prednisone and reevaluate. She was feeling much better after  these meds and comfortable with dc home. Sent Rx for prednisone and lidocaine patches into her pharmacy and offered neurosurgery referral but she declined. She will proceed with her PT appointment and MRI next week.  Dispo: Patient is safe/stable for discharge home and was discharged home with strict return precautions. Provided verbal and written supportive care instructions for managing her illness. Advised patient to return to the nearest emergency room if she has new or worsening symptoms or if any questions arise. Advised patient to follow-up with her family doctor and pain management. Patient and  are very satisfied with care and agree with management and plan.     Amount and/or Complexity of Data Reviewed  External Data Reviewed: labs, radiology and notes.  Radiology:  Decision-making details documented in ED Course.    Risk  OTC drugs.  Prescription drug management.        ED Course as of 02/10/25 1652   Mon Feb 10, 2025   1507 Blood Pressure: 132/74  Vital signs reviewed and within normal limits.   1509 XR 1/7/25 LEFT SHOULDER  INDICATION: Pain in left shoulder. Other chronic pain.   COMPARISON:  None.  VIEWS:  XR SHOULDER 2+ VW LEFT   FINDINGS:  There is no acute fracture or dislocation.  No significant degenerative changes.  No lytic or blastic osseous lesion.  Soft tissues are unremarkable.  IMPRESSION:  No acute osseous abnormality.   1549 2/13/24 CT CERVICAL SPINE - WITHOUT CONTRAST INDICATION:  Paresthesias.  COMPARISON:  None.  TECHNIQUE:  CT examination of the cervical spine was performed without intravenous contrast.  Contiguous axial images were obtained. Multiplanar 2D reformatted images were created from the source data.  Radiation dose length product (DLP) for this visit:  318.5 mGy-cm .  This examination, like all CT scans performed in the [facility], was performed utilizing techniques to minimize radiation dose exposure, including the use of iterative   reconstruction  and automated exposure control.  IMAGE QUALITY:  Diagnostic.  FINDINGS:  ALIGNMENT:  Normal alignment of the cervical spine. No subluxation.  VERTEBRAE:  No fracture.  DEGENERATIVE CHANGES:  Mild multilevel cervical degenerative changes are noted without critical central canal stenosis.  PREVERTEBRAL AND PARASPINAL SOFT TISSUES: Unremarkable  THORACIC INLET:  Normal.  IMPRESSION:  No cervical spine fracture or traumatic malalignment.   1635 Reassessed patient and she is feeling better and comfortable with discharge home.       Medications   lidocaine (LIDODERM) 5 % patch 1 patch (1 patch Topical Medication Applied 2/10/25 1603)   ketorolac (TORADOL) injection 30 mg (30 mg Intramuscular Given 2/10/25 1601)   acetaminophen (TYLENOL) tablet 650 mg (650 mg Oral Given 2/10/25 1600)   predniSONE tablet 40 mg (40 mg Oral Given 2/10/25 1600)       ED Risk Strat Scores                          SBIRT 22yo+      Flowsheet Row Most Recent Value   Initial Alcohol Screen: US AUDIT-C     1. How often do you have a drink containing alcohol? 0 Filed at: 02/10/2025 1506   2. How many drinks containing alcohol do you have on a typical day you are drinking?  0 Filed at: 02/10/2025 1506   3a. Male UNDER 65: How often do you have five or more drinks on one occasion? 0 Filed at: 02/10/2025 1506   3b. FEMALE Any Age, or MALE 65+: How often do you have 4 or more drinks on one occassion? 0 Filed at: 02/10/2025 1506   Audit-C Score 0 Filed at: 02/10/2025 1506   REGULO: How many times in the past year have you...    Used an illegal drug or used a prescription medication for non-medical reasons? Never Filed at: 02/10/2025 1506                            History of Present Illness       Chief Complaint   Patient presents with    Shoulder Pain     Left shoulder pain described as a burning sensation   Started as neck pain       Past Medical History:   Diagnosis Date    Hyperparathyroidism (HCC)     Lupus       Past Surgical History:   Procedure  Laterality Date    BREAST BIOPSY Left 2019    benign    PARATHYROIDECTOMY      2020      Family History   Problem Relation Age of Onset    Multiple sclerosis Mother     Colon cancer Father     No Known Problems Daughter     No Known Problems Daughter     No Known Problems Maternal Grandmother     No Known Problems Paternal Grandmother     Colon cancer Paternal Aunt     Colon cancer Paternal Aunt       Social History     Tobacco Use    Smoking status: Never    Smokeless tobacco: Never   Vaping Use    Vaping status: Never Used   Substance Use Topics    Alcohol use: Not Currently    Drug use: Never      E-Cigarette/Vaping    E-Cigarette Use Never User       E-Cigarette/Vaping Substances    Nicotine No     THC No     CBD No     Flavoring No     Other No     Unknown No       I have reviewed and agree with the history as documented.     Patient is a 49-year-old female with relevant past medical history of hyperparathyroidism, lupus, left shoulder pain, and cervical radiculitis presenting with worsening left shoulder and left upper arm. Patient reports this is not her first time here and she follows with spine and pain and has had worsening pain in her left neck and left shoulder for almost 2 months now. She reports difficulty raising her left arm and brushing her hair and is scheduled for physical therapy and a cervical MRI on Tuesday. She woke up with worsening pain this morning so she presents to the emergency room. She called her pain management doctor today who prescribed her gabapentin which she did not  yet and ordered a cervical MRI for next week. She describes her pain as burning and it is contiguous.  Last pain medications today was 1 Tylenol, 1 Aleve, and Voltaren gel around 6 AM. She denies falls, trauma, fevers, chills, headache, dizziness, back pain, numbness, tingling, chest pain, shortness of breath, abdominal pain, or N/V.      History provided by:  Patient and spouse   used: No     Shoulder Pain  Associated symptoms: neck pain    Associated symptoms: no back pain and no fever        Review of Systems   Constitutional:  Negative for chills and fever.   HENT:  Negative for congestion, ear pain, rhinorrhea and sore throat.    Eyes:  Negative for pain and visual disturbance.   Respiratory:  Negative for cough and shortness of breath.    Cardiovascular:  Negative for chest pain and palpitations.   Gastrointestinal:  Negative for abdominal pain, constipation, diarrhea, nausea and vomiting.   Genitourinary:  Negative for dysuria, frequency, hematuria and urgency.   Musculoskeletal:  Positive for neck pain. Negative for arthralgias and back pain.        Left shoulder pain and left upper arm pain.   Skin:  Negative for color change and rash.   Neurological:  Negative for dizziness, seizures, syncope, light-headedness and headaches.   Psychiatric/Behavioral:  Negative for agitation and confusion.            Objective       ED Triage Vitals   Temperature Pulse Blood Pressure Respirations SpO2 Patient Position - Orthostatic VS   02/10/25 1504 02/10/25 1506 02/10/25 1506 02/10/25 1504 02/10/25 1507 02/10/25 1506   97.6 °F (36.4 °C) 81 132/74 16 99 % Sitting      Temp Source Heart Rate Source BP Location FiO2 (%) Pain Score    02/10/25 1504 02/10/25 1504 02/10/25 1506 -- 02/10/25 1504    Temporal Monitor Right arm  6      Vitals      Date and Time Temp Pulse SpO2 Resp BP Pain Score FACES Pain Rating User   02/10/25 1630 -- 59 97 % 16 108/67 -- -- DK   02/10/25 1621 -- 56 97 % -- 122/71 -- -- DK   02/10/25 1606 -- 68 98 % -- 121/59 -- -- DK   02/10/25 1600 -- -- -- -- -- 6 -- DK   02/10/25 1507 -- -- 99 % -- -- -- -- NAD   02/10/25 1506 -- 81 -- -- 132/74 -- -- NAD   02/10/25 1504 97.6 °F (36.4 °C) -- -- 16 -- 6 -- NAD            Physical Exam  Vitals and nursing note reviewed.   Constitutional:       General: She is not in acute distress.     Appearance: She is well-developed. She is not ill-appearing.    HENT:      Head: Normocephalic and atraumatic.   Eyes:      Conjunctiva/sclera: Conjunctivae normal.   Cardiovascular:      Rate and Rhythm: Normal rate and regular rhythm.      Heart sounds: No murmur heard.  Pulmonary:      Effort: Pulmonary effort is normal. No respiratory distress.      Breath sounds: Normal breath sounds. No wheezing, rhonchi or rales.   Abdominal:      Palpations: Abdomen is soft.      Tenderness: There is no abdominal tenderness. There is no guarding or rebound.   Musculoskeletal:         General: No swelling.      Left shoulder: Normal.      Left upper arm: Normal.      Cervical back: Normal and neck supple. No bony tenderness.      Thoracic back: Normal. No bony tenderness.      Lumbar back: Normal. No bony tenderness.      Comments: No tenderness, deformity, ecchymosis, erythema, warmth, or induration to left shoulder or left upper arm. Left arm NV intact. +Spurling test.   Skin:     General: Skin is warm and dry.      Capillary Refill: Capillary refill takes less than 2 seconds.   Neurological:      General: No focal deficit present.      Mental Status: She is alert and oriented to person, place, and time.      GCS: GCS eye subscore is 4. GCS verbal subscore is 5. GCS motor subscore is 6.      Cranial Nerves: Cranial nerves 2-12 are intact.      Sensory: Sensation is intact.      Motor: Motor function is intact.      Coordination: Coordination is intact.      Gait: Gait is intact.   Psychiatric:         Mood and Affect: Mood normal.         Results Reviewed       None            No orders to display       ECG 12 Lead Documentation Only    Date/Time: 2/10/2025 4:09 PM    Performed by: Akin Gutierrez PA-C  Authorized by: Akin Gutierrez PA-C    Indications / Diagnosis:  Left arm pain in female.  ECG reviewed by me, the ED Provider: yes    Patient location:  ED  Previous ECG:     Comparison to cardiac monitor: Yes    Interpretation:     Interpretation: abnormal    Rate:     ECG  rate:  68    ECG rate assessment: normal    Rhythm:     Rhythm: sinus rhythm      Rhythm comment:  Sinus arrhythmia.  Ectopy:     Ectopy: none    QRS:     QRS axis:  Normal    QRS intervals:  Normal  Conduction:     Conduction: normal    ST segments:     ST segments:  Non-specific  T waves:     T waves: normal        ED Medication and Procedure Management   Prior to Admission Medications   Prescriptions Last Dose Informant Patient Reported? Taking?   cyclobenzaprine (FLEXERIL) 10 mg tablet  Self No No   Sig: Take 1 tablet (10 mg total) by mouth 2 (two) times a day as needed for muscle spasms   Patient not taking: Reported on 1/6/2025   gabapentin (Neurontin) 300 mg capsule   No No   Sig: Take 1 tablet at night for 3 days, then increase to 1 tablet two times a day for 3 days, then increase to 1 tablet three times a day after that.   methocarbamol (ROBAXIN) 500 mg tablet  Self No No   Sig: Take 1 tablet (500 mg total) by mouth 2 (two) times a day as needed for muscle spasms   methylPREDNISolone 4 MG tablet therapy pack   No No   Sig: Use as directed on package   naproxen (Naprosyn) 500 mg tablet  Self No No   Sig: Take 1 tablet (500 mg total) by mouth 2 (two) times a day with meals      Facility-Administered Medications: None     Discharge Medication List as of 2/10/2025  4:40 PM        START taking these medications    Details   lidocaine (Lidoderm) 5 % Apply 1 patch topically over 12 hours daily Remove & Discard patch within 12 hours or as directed by MD, Starting Mon 2/10/2025, Normal      predniSONE 20 mg tablet Take 2 tablets (40 mg total) by mouth daily for 4 days, Starting Mon 2/10/2025, Until Fri 2/14/2025, Normal           CONTINUE these medications which have NOT CHANGED    Details   cyclobenzaprine (FLEXERIL) 10 mg tablet Take 1 tablet (10 mg total) by mouth 2 (two) times a day as needed for muscle spasms, Starting Mon 12/16/2024, Normal      gabapentin (Neurontin) 300 mg capsule Take 1 tablet at night for  3 days, then increase to 1 tablet two times a day for 3 days, then increase to 1 tablet three times a day after that., Normal      methocarbamol (ROBAXIN) 500 mg tablet Take 1 tablet (500 mg total) by mouth 2 (two) times a day as needed for muscle spasms, Starting Mon 1/6/2025, Normal      methylPREDNISolone 4 MG tablet therapy pack Use as directed on package, Normal      naproxen (Naprosyn) 500 mg tablet Take 1 tablet (500 mg total) by mouth 2 (two) times a day with meals, Starting Mon 12/16/2024, Normal           No discharge procedures on file.  ED SEPSIS DOCUMENTATION   Time reflects when diagnosis was documented in both MDM as applicable and the Disposition within this note       Time User Action Codes Description Comment    2/10/2025  3:57 PM Akin Gutierrez Add [M54.12] Left cervical radiculopathy                  Akin Gutierrez PA-C  02/10/25 7599

## 2025-02-10 NOTE — DISCHARGE INSTRUCTIONS
Immediately return to the emergency room if you experience any new or worsening symptoms or if the symptoms are lasting longer than expected.     Please follow-up with pain management and proceed with your therapy and MRI appointment next week as scheduled. Continue with multimodal pain control including ibuprofen, Tylenol, heating pad, lidocaine patches, gabapentin, stretching, etc. for pain. Complete the prednisone as prescribed.    Please take ibuprofen (Motrin, Advil) or acetaminophen (Tylenol) as needed for pain. These are available over-the-counter. You can take ibuprofen 400-600 mg every 4-6 hours with food for pain. You can also take acetaminophen 500-650 mg every 4-6 hours for pain. Do not exceed 4000 mg of Tylenol a day as this can cause liver damage. Do not drink alcohol with either of these medications. Evidence-based research has shown taking these 2 drugs together work the same (or better in some cases) for pain than opioids or narcotic pain medication.

## 2025-02-10 NOTE — Clinical Note
Rosario Green was seen and treated in our emergency department on 2/10/2025.    No restrictions            Diagnosis: Cervical radiculopathy    Rosario  may return to school on return date.    She may return on this date: 02/11/2025         If you have any questions or concerns, please don't hesitate to call.      Akin Gutierrez PA-C    ______________________________           _______________          _______________  Hospital Representative                              Date                                Time

## 2025-02-10 NOTE — TELEPHONE ENCOUNTER
Placed order for cervical spine MRI for further evaluation, please assist with scheduling. Also increase gabapentin to 300 mg tid - 1 tablet at night for 3 days, then 1 tablet two times a day for 3 days, then 1 tablet three times a day after that. Gabapentin may cause vision changes, clumsiness, unsteadiness, dizziness, drowsiness, sleepiness, or trouble with thinking. Make sure you know how you react to this medicine before you drive, use machines, or do anything else that could be dangerous if you are not alert, well-coordinated, or able to think or see well.     Schedule sooner f/u for further eval. If symptoms intractable or associated with new neuro deficits, seek urgent attention at ER.

## 2025-02-11 ENCOUNTER — RESULTS FOLLOW-UP (OUTPATIENT)
Dept: EMERGENCY DEPT | Facility: HOSPITAL | Age: 50
End: 2025-02-11

## 2025-02-11 LAB
ATRIAL RATE: 68 BPM
P AXIS: 70 DEGREES
PR INTERVAL: 164 MS
QRS AXIS: 84 DEGREES
QRSD INTERVAL: 94 MS
QT INTERVAL: 386 MS
QTC INTERVAL: 410 MS
T WAVE AXIS: 46 DEGREES
VENTRICULAR RATE: 68 BPM

## 2025-02-11 PROCEDURE — 93010 ELECTROCARDIOGRAM REPORT: CPT | Performed by: INTERNAL MEDICINE

## 2025-02-18 ENCOUNTER — OFFICE VISIT (OUTPATIENT)
Age: 50
End: 2025-02-18
Payer: COMMERCIAL

## 2025-02-18 ENCOUNTER — EVALUATION (OUTPATIENT)
Dept: PHYSICAL THERAPY | Facility: CLINIC | Age: 50
End: 2025-02-18
Payer: COMMERCIAL

## 2025-02-18 ENCOUNTER — HOSPITAL ENCOUNTER (OUTPATIENT)
Dept: MRI IMAGING | Facility: HOSPITAL | Age: 50
Discharge: HOME | End: 2025-02-18
Attending: STUDENT IN AN ORGANIZED HEALTH CARE EDUCATION/TRAINING PROGRAM
Payer: COMMERCIAL

## 2025-02-18 VITALS — BODY MASS INDEX: 21.35 KG/M2 | WEIGHT: 116 LBS | HEIGHT: 62 IN

## 2025-02-18 DIAGNOSIS — M21.619 BUNION: Primary | ICD-10-CM

## 2025-02-18 DIAGNOSIS — M79.671 RIGHT FOOT PAIN: ICD-10-CM

## 2025-02-18 DIAGNOSIS — M54.12 CERVICAL RADICULITIS: ICD-10-CM

## 2025-02-18 DIAGNOSIS — G89.29 CHRONIC LEFT SHOULDER PAIN: Primary | ICD-10-CM

## 2025-02-18 DIAGNOSIS — M75.52 BURSITIS OF LEFT SHOULDER: ICD-10-CM

## 2025-02-18 DIAGNOSIS — M54.2 CERVICALGIA: ICD-10-CM

## 2025-02-18 DIAGNOSIS — M79.18 MYOFASCIAL PAIN SYNDROME: ICD-10-CM

## 2025-02-18 DIAGNOSIS — M25.512 CHRONIC LEFT SHOULDER PAIN: Primary | ICD-10-CM

## 2025-02-18 PROCEDURE — 97112 NEUROMUSCULAR REEDUCATION: CPT | Performed by: PHYSICAL THERAPIST

## 2025-02-18 PROCEDURE — 99203 OFFICE O/P NEW LOW 30 MIN: CPT

## 2025-02-18 PROCEDURE — 72141 MRI NECK SPINE W/O DYE: CPT

## 2025-02-18 PROCEDURE — 97162 PT EVAL MOD COMPLEX 30 MIN: CPT | Performed by: PHYSICAL THERAPIST

## 2025-02-18 NOTE — PROGRESS NOTES
Name: Rosario Green      : 1975      MRN: [id number]  Encounter Provider: Rosas Boyd DPM  Encounter Date: 2025   [facility]  :  Assessment & Plan  Bunion    Orders:    XR foot 3+ vw right; Future    Right foot pain         -Patient was educated regarding their condition.  -We discussed conservative treatment vs surgical intervention. The patient has elected to attempt conservative therapy for now. I explained to her that although this will not correct their deformity, it may be helpful in reducing the pain.  -Recommend purchase of supportive shoes with wide toe box. Discussed the use of toe spacers and silicone bunion pads. Recommend purchase of OTC orthosis (superfeet, powersteps, or tread labs).  -Patient will RTC in 3-months    -X-ray from 2025 of the right foot interpreted independently. AP, Lateral, and sesamoid axial views noted. No fractures noted. IM angle 10.4 degrees. Hallux abductus angle 21 degrees. Hallux interphalangeus angle 6 degrees. Tibial sesamoid position 4. mild metatarsus primus elevatus noted. Walter angle 25. 1st MTPJ joint space narrowed. This x-ray is consistent with a moderate hallux abductovalgus deformity.      History of Present Illness   HPI  Rosario Green is a 49 y.o. female who presents with right foot pain secondary to bunion deformity.  Patient reports she has occasional pain at right foot bunion on and off for the past 2 years.  She has tried wider toe box shoes and icing.  She has also tried silicone padding across forefoot.  She is here today for evaluation and other treatment options.    History obtained from: patient    Review of Systems  Current Outpatient Medications on File Prior to Visit   Medication Sig Dispense Refill    gabapentin (Neurontin) 300 mg capsule Take 1 tablet at night for 3 days, then increase to 1 tablet two times a day for 3 days, then increase to 1 tablet three times a day  "after that. 90 capsule 0    lidocaine (Lidoderm) 5 % Apply 1 patch topically over 12 hours daily Remove & Discard patch within 12 hours or as directed by MD 14 patch 0    methocarbamol (ROBAXIN) 500 mg tablet Take 1 tablet (500 mg total) by mouth 2 (two) times a day as needed for muscle spasms 60 tablet 0    methylPREDNISolone 4 MG tablet therapy pack Use as directed on package 1 each 0    naproxen (Naprosyn) 500 mg tablet Take 1 tablet (500 mg total) by mouth 2 (two) times a day with meals 30 tablet 0    cyclobenzaprine (FLEXERIL) 10 mg tablet Take 1 tablet (10 mg total) by mouth 2 (two) times a day as needed for muscle spasms (Patient not taking: Reported on 1/6/2025) 20 tablet 0     No current facility-administered medications on file prior to visit.         Objective   Ht 5' 2\" (1.575 m)   Wt 52.6 kg (116 lb)   BMI 21.22 kg/m²      Physical Exam    MSK right foot:  -Pain on palpation right medial 1st metatarsal head  -Hallux IPJ ROM WNL  -Active range of motion lesser digits intact  -MMT 5/5 to all muscle compartments of the lower extremity  -I note a large prominence at the medial aspect of the 1st MTPJ of the right foot. The hallux is in an abducted position with no overlap of the second toe.   -Hallux abductovalgus deformity is tracking  -1st MTPJ ROM is intact with no crepitus noted  -TMTJ hypermobility is noted    Derm:  -No lesions, abrasions, or open wounds noted  -erythema noted at right medial-dorsal first metatarsal head  -Calluses are not present at     Vascular:  -DP and PT pulses intact b/l  -Capillary refill time <2 sec b/l  -Digital hair growth: Present  -Skin temp: WNL    Neuro:  -Light sensation intact bilaterally  -Protective sensation intact bilaterally with 10/10 points felt with [place] monofilament      "

## 2025-02-18 NOTE — PROGRESS NOTES
PT Evaluation     Today's date: 2025  Patient name: Rosario Green  : 1975  MRN: [id number]  Referring provider: Camila Ashby MD  Dx:   Encounter Diagnosis     ICD-10-CM    1. Chronic left shoulder pain  M25.512     G89.29       2. Bursitis of left shoulder  M75.52       3. Cervicalgia  M54.2       4. Myofascial pain syndrome  M79.18           Start Time: 1445  Stop Time: 1530  Total time in clinic (min): 45 minutes    Assessment  Impairments: abnormal muscle tone, abnormal or restricted ROM, activity intolerance, impaired physical strength, lacks appropriate home exercise program, pain with function, scapular dyskinesis, poor posture  and poor body mechanics  Functional limitations: lifitng, twisting, carrying, reaching    Assessment details:   Rosario Green was seen for an initial PT evaluation today. Patient is a 49 y.o. female with diagnosis of cervical radiculopathy and shoulder pain and past medical history significant for lupus, hyperparathyroidism with parathyroidectomy, anemia. Moderate complexity evaluation  due to number of participation restrictions, functional outcome measure of 53% limitation, and evolving clinical presentation. Findings today show cervical range of motion restriction to side of involvement, restriction of Left shoulder strength and range of motion with painful end feels, posture abnormality and significant pain impacting overall functional mobility including ability to lifting, reach, carry, sleep. Skilled PT indicated to treat at this time to address above stated deficits and return patient to PLOF.       Goals  STG (6 weeks)  1. Patient will display good seated posture with decreased forward head and retracted shoulders for 2 consecutive sessions with 50% VC.   2. Patient will have 0/10 cervical pain at rest.   3. Improve cervical rotation by 25% for improved ability to drive.   LTG (12 weeks)  1. Improved FOTO score by 15% for improved improved overall  function.   2. Patient will be able to reach overhead with 0/10 pain for return to PLOF.   3. Patient will be independent with home exercise program for continued maintenance post PT DC.       Plan  Patient would benefit from: skilled physical therapy  Planned modality interventions: unattended electrical stimulation, thermotherapy: hydrocollator packs and cryotherapy    Planned therapy interventions: manual therapy, neuromuscular re-education, therapeutic activities, therapeutic exercise, self care and home exercise program    Frequency: 2x week  Duration in weeks: 12  Plan of Care beginning date: 2025  Plan of Care expiration date: 2025  Treatment plan discussed with: patient and PTA  Plan details: Progress note in 4 weeks.         Subjective Evaluation    History of Present Illness  Date of onset: 2024  Mechanism of injury: Rosario Green is a 49 y.o. female who presents to outpatient Physical Therapy today with complaints of cervical and L shoulder pain. Chronic issues with neck, but about 2 months ago began having more pain with difficulty moving neck that radiated into LUE and is now having difficulty with left shoulder reaching, lifting and quick movements.     Patient Goals  Patient goals for therapy: decreased pain  Patient goal: Improve L shoulder range of motion, be able to reach overhead or reach behind back  Pain  Current pain ratin  At best pain rating: 3  At worst pain rating: 10  Location: L sided neck pain into L proximal arm  Quality: burning  Relieving factors: medications, ice and heat  Exacerbated by: lifting, jerky motions, reaching behind back, sleeping on R side.    Social Support    Employment status: working (/waitressing; going to school)  Hand dominance: right          Objective     Concurrent Complaints  Negative for dizziness, headaches, tinnitus, trouble swallowing, difficulty breathing and visual change    Postural Observations    Additional Postural  Observation Details  Upper crossed    Tenderness     Left Shoulder   No tenderness in the subacromial bursa and supraspinatus tendon.     Neurological Testing     Sensation   Cervical/Thoracic   Left   Intact: light touch    Right   Intact: light touch    Comments   Left light touch: C5.     Reflexes   Left   Canas's reflex: negative    Right   Canas's reflex: negative    Active Range of Motion   Cervical/Thoracic Spine       Cervical    Subcranial retraction:   Restriction level: minimal  Flexion: 80 degrees   Extension: 50 degrees      Left lateral flexion: 50 degrees      Right lateral flexion: 50 degrees      Left rotation: 40 degrees  Right rotation: 70 degrees     Left Shoulder   Flexion: 65 degrees with pain  Extension: 35 degrees with pain  Abduction: 35 degrees with pain  External rotation 0°: 30 degrees with pain  Internal rotation 0°: 20 degrees with pain    Right Shoulder   Flexion: 170 degrees   Extension: 65 degrees   Abduction: 170 degrees   External rotation BTH: T4   Internal rotation BTB: T4     Passive Range of Motion   Left Shoulder   Flexion: 90 degrees   Abduction: 45 degrees   External rotation 0°: 30 degrees   Internal rotation 0°: 30 degrees   Mechanical Assessment    Cervical    Seated retraction: repeated movements   Pain location: no change  Re-tested with L shoulder flexion  Seated Flexion:  repeated movements  Pain location: no change  Seated Extension: repeated movements  Pain location: no change    Thoracic      Lumbar      Strength/Myotome Testing     Left Shoulder     Planes of Motion   Flexion: 2+   Abduction: 2+   External rotation at 0°: 2+   Internal rotation at 0°: 3-     Isolated Muscles   Biceps: 4   Triceps: 3-     Right Shoulder     Planes of Motion   Flexion: 5   Abduction: 5   External rotation at 0°: 5   Internal rotation at 0°: 5     Isolated Muscles   Biceps: 5   Triceps: 5     Left Wrist/Hand   Wrist extension: 5  Wrist flexion: 5  Thumb extension: 5    Right  Wrist/Hand   Wrist extension: 5  Wrist flexion: 5  Thumb extension: 5    Tests   Cervical   Negative vertical compression, alar ligament test, Sharp-Adrienne test and VBI.     Left   Negative Spurling's Test B.     Right   Negative Spurling's Test B.     Left Shoulder   Positive Hawkin's.   Negative ULTT1, ULTT3 and ULTT4.     Lumbar   Negative vertical compression.     Additional Tests Details  Nguyen positive for pain, located at proximal arm not shoulder.     NTT unable to achieve appropriate end range due to pain, unchanged with decreased proximal tension.     General Comments:      Cervical/Thoracic Comments    FOTO: 47% function, 66% predicted function     Neuro Exam:     Headaches   Patient reports headaches: No.              Precautions: none noted   Access code: [email or web address]  Progress note: 3/18  POC: 5/18    Manuals 2/18       stretching        SOR *       Cervical traction *       C/S PA and side glide mobs Rotation to L       T/S PA mobs *       Scapular PNF        IASTM        Neuro Re-Ed        UBE        scap retraction 10x       Shoulder rolls 10x       Chin tucks 10x       Supine chin tuck head lift        MTP/LTP                Ther Ex        UT stretch *       LS stretch *       pendulums 10x ea       C/S AROM        Serratus punch        Prone row        Prone I, T's thumbs up and down        Standing YTI        Lat pull down        LT lift off Standing        Table slide flex, scap *       Necks level *ext, rot, chin tuck                               Ther Activity                        Gait Training                        Modalities

## 2025-02-18 NOTE — PATIENT INSTRUCTIONS
Purchase a supportive pair of sneakers such as Prince, Hoka, Saucony, New Balance, On Cloud, Altra.  Some qualities to look for is that the shoe should bend only where the toes bend, the sole should not be too flimsy, it should have a wide toe box and a somewhat stiff heel support. Purchase a supportive over-the-counter pair of inserts such as Superfeet, powersteps, tread labs.    [employer]  [address] [facility] [address]  [phone]     [employer]  [address], [place] [address]  [phone]     [employer]  [address] [place] [address] [phone]     [employer]   [address] [facility]  [phone]     [employer]  [address], [place], [place] [address] [phone]     [employer]  [address] [place], [place] [address] [phone]     [facility] [employer] [facility]   [address] [place] [address]  [phone]     [employer]  [address] [place], [place]  [phone]     [employer] [address], [address]  [phone]     [employer]  [address] [place] [address], [address], [place] [address] [phone]

## 2025-02-24 ENCOUNTER — OFFICE VISIT (OUTPATIENT)
Dept: PHYSICAL THERAPY | Facility: CLINIC | Age: 50
End: 2025-02-24
Payer: COMMERCIAL

## 2025-02-24 ENCOUNTER — RESULTS FOLLOW-UP (OUTPATIENT)
Dept: PAIN MEDICINE | Facility: CLINIC | Age: 50
End: 2025-02-24

## 2025-02-24 DIAGNOSIS — M79.18 MYOFASCIAL PAIN SYNDROME: ICD-10-CM

## 2025-02-24 DIAGNOSIS — M75.52 BURSITIS OF LEFT SHOULDER: ICD-10-CM

## 2025-02-24 DIAGNOSIS — M54.2 CERVICALGIA: ICD-10-CM

## 2025-02-24 DIAGNOSIS — G89.29 CHRONIC LEFT SHOULDER PAIN: Primary | ICD-10-CM

## 2025-02-24 DIAGNOSIS — M25.512 CHRONIC LEFT SHOULDER PAIN: Primary | ICD-10-CM

## 2025-02-24 PROCEDURE — 97112 NEUROMUSCULAR REEDUCATION: CPT

## 2025-02-24 PROCEDURE — 97110 THERAPEUTIC EXERCISES: CPT

## 2025-02-24 PROCEDURE — 97140 MANUAL THERAPY 1/> REGIONS: CPT

## 2025-02-24 NOTE — PROGRESS NOTES
Daily Note     Today's date: 2025  Patient name: Rosario Green  : 1975  MRN: [id number]  Referring provider: Camila Ashby MD  Dx:   Encounter Diagnosis     ICD-10-CM    1. Chronic left shoulder pain  M25.512     G89.29       2. Bursitis of left shoulder  M75.52       3. Cervicalgia  M54.2       4. Myofascial pain syndrome  M79.18           Start Time: 1535          Subjective: Patient states she has been feeling suprisingly better. Today her pain is a 1/10 in the left shoulder.       Objective: See treatment diary below    [facility] home exercise program updated to include additional exercises. Handout issued and explained with demonstration. Patient accepts new exercises.     Assessment: Tolerated treatment well. Patient would benefit from continued PT for stretching and strengthening. She was able to add exercises to her program with little difficulty and discomfort. Patient seemed to understand all education on new exercises. She felt she was tired, but the pain did not increase by the end of the session. She feels later the pain will get worse and she will just take ibuprofen for it.       Plan: Continue per plan of care.  Progress treatment as tolerated.       Precautions: none noted   Access code: [email or web address]  Progress note: 3/18  POC:     Manuals       stretching        SOR * 1x3 min      Cervical traction * :15x8      C/S PA and side glide mobs Rotation to L Rotation to L      T/S PA mobs *       Scapular PNF        IASTM        Neuro Re-Ed        UBE        scap retraction 10x x10      Shoulder rolls 10x x10      Chin tucks 10x x10      Supine chin tuck head lift        MTP/LTP                Ther Ex        UT stretch * :15x4 B/L      LS stretch * :15x4 B/L      pendulums 10x ea X10 ea      C/S AROM        Serratus punch        Prone row        Prone I, T's thumbs up and down        Standing YTI        Lat pull down        LT lift off Standing        Table slide flex, scap *  X10 ea      Necks level *ext, rot, chin tuck X10 ea                               Ther Activity                        Gait Training                        Modalities                            Access Code: [email or web address]  URL: [email or web address]  Date: 02/24/2025  Prepared by: Nicole Mason    Exercises  - Seated Scapular Retraction  - 3 x daily - 7 x weekly - 1 sets - 10 reps  - Seated Cervical Retraction  - 3 x daily - 7 x weekly - 1 sets - 10 reps - 3 sec hold  - Circular Shoulder Pendulum with Table Support  - 3 x daily - 7 x weekly - 3 sets - 10 reps  - Flexion-Extension Shoulder Pendulum with Table Support  - 3 x daily - 7 x weekly - 3 sets - 10 reps  - Horizontal Shoulder Pendulum with Table Support  - 3 x daily - 7 x weekly - 3 sets - 10 reps  - Standing Backward Shoulder Rolls  - 3 x daily - 7 x weekly - 3 sets - 10 reps  - Seated Upper Trapezius Stretch  - 2 x daily - 7 x weekly - 1 sets - 4 reps -  sec hold  - Gentle Levator Scapulae Stretch  - 2 x daily - 7 x weekly - 1 sets - 4 reps - 15 sec hold  - Seated Shoulder Flexion Towel Slide at Table Top  - 2 x daily - 7 x weekly - 1 sets - 10 reps - 5-10 sec hold  - Seated Shoulder Scaption Slide at Table Top with Forearm in Neutral  - 2 x daily - 7 x weekly - 1 sets - 10 reps - 5-10 sec hold

## 2025-02-25 ENCOUNTER — OFFICE VISIT (OUTPATIENT)
Dept: PHYSICAL THERAPY | Facility: CLINIC | Age: 50
End: 2025-02-25
Payer: COMMERCIAL

## 2025-02-25 DIAGNOSIS — M79.18 MYOFASCIAL PAIN SYNDROME: ICD-10-CM

## 2025-02-25 DIAGNOSIS — M25.512 CHRONIC LEFT SHOULDER PAIN: Primary | ICD-10-CM

## 2025-02-25 DIAGNOSIS — M75.52 BURSITIS OF LEFT SHOULDER: ICD-10-CM

## 2025-02-25 DIAGNOSIS — M54.2 CERVICALGIA: ICD-10-CM

## 2025-02-25 DIAGNOSIS — G89.29 CHRONIC LEFT SHOULDER PAIN: Primary | ICD-10-CM

## 2025-02-25 PROCEDURE — 97112 NEUROMUSCULAR REEDUCATION: CPT | Performed by: PHYSICAL THERAPIST

## 2025-02-25 PROCEDURE — 97110 THERAPEUTIC EXERCISES: CPT | Performed by: PHYSICAL THERAPIST

## 2025-02-25 PROCEDURE — 97140 MANUAL THERAPY 1/> REGIONS: CPT | Performed by: PHYSICAL THERAPIST

## 2025-02-25 NOTE — PROGRESS NOTES
Daily Note     Today's date: 2025  Patient name: Rosario Green  : 1975  MRN: [id number]  Referring provider: Camila Ashby MD  Dx:   Encounter Diagnosis     ICD-10-CM    1. Chronic left shoulder pain  M25.512     G89.29       2. Bursitis of left shoulder  M75.52       3. Cervicalgia  M54.2       4. Myofascial pain syndrome  M79.18           Start Time: 1445  Stop Time: 1530  Total time in clinic (min): 45 minutes    Subjective: States she has an occasional twinge, but overall pain right now is 1/10. Some soreness after last session and will continue to occasionally wake in the night, but overall is feeling better.       Objective: See treatment diary below      Assessment: Tolerated treatment well. Improved cervical rotation to L passive vs actively. Did well with HEP with minimal increased LUE pain. Added cervical retraction/ext into program today as well as SNAG to L. Patient exhibited good technique with therapeutic exercises and would benefit from continued PT      Plan: Continue per plan of care.  Progress treatment as tolerated.       Precautions: none noted   Access code: [email or web address]  Progress note: 3/18  POC:     Manuals      stretching        SOR * 1x3 min KB     Cervical traction * :15x8 KB     C/S PA and side glide mobs Rotation to L Rotation to L L rotation     T/S PA mobs *  nv     Scapular PNF        IASTM        Neuro Re-Ed        UBE        scap retraction 10x x10 10x     Shoulder rolls 10x x10 10x     Chin tucks 10x x10 10x     Chin tuck neck ext   10x     Supine chin tuck    :05x10     MTP/LTP        SNAG L   Used RUE to turn L 10x     Ther Ex        UT stretch * :15x4 B/L :15x4 B/L     LS stretch * :15x4 B/L      pendulums 10x ea X10 ea 10x ea     C/S AROM        Serratus punch        Prone row        Prone I, T's thumbs up and down        Standing YTI        Lat pull down        LT lift off Standing        Table slide flex, scap * X10 ea 10x ea     Necks level  *ext, rot, chin tuck X10 ea  L rot, ext, chin tuck 10x ea                             Ther Activity                        Gait Training                        Modalities                            Access Code: [email or web address]  URL: [email or web address]  Date: 02/24/2025  Prepared by: Nicole Mason    Exercises  - Seated Scapular Retraction  - 3 x daily - 7 x weekly - 1 sets - 10 reps  - Seated Cervical Retraction  - 3 x daily - 7 x weekly - 1 sets - 10 reps - 3 sec hold  - Circular Shoulder Pendulum with Table Support  - 3 x daily - 7 x weekly - 3 sets - 10 reps  - Flexion-Extension Shoulder Pendulum with Table Support  - 3 x daily - 7 x weekly - 3 sets - 10 reps  - Horizontal Shoulder Pendulum with Table Support  - 3 x daily - 7 x weekly - 3 sets - 10 reps  - Standing Backward Shoulder Rolls  - 3 x daily - 7 x weekly - 3 sets - 10 reps  - Seated Upper Trapezius Stretch  - 2 x daily - 7 x weekly - 1 sets - 4 reps -  sec hold  - Gentle Levator Scapulae Stretch  - 2 x daily - 7 x weekly - 1 sets - 4 reps - 15 sec hold  - Seated Shoulder Flexion Towel Slide at Table Top  - 2 x daily - 7 x weekly - 1 sets - 10 reps - 5-10 sec hold  - Seated Shoulder Scaption Slide at Table Top with Forearm in Neutral  - 2 x daily - 7 x weekly - 1 sets - 10 reps - 5-10 sec hold

## 2025-03-03 ENCOUNTER — OFFICE VISIT (OUTPATIENT)
Dept: PHYSICAL THERAPY | Facility: CLINIC | Age: 50
End: 2025-03-03
Payer: COMMERCIAL

## 2025-03-03 DIAGNOSIS — G89.29 CHRONIC LEFT SHOULDER PAIN: Primary | ICD-10-CM

## 2025-03-03 DIAGNOSIS — M75.52 BURSITIS OF LEFT SHOULDER: ICD-10-CM

## 2025-03-03 DIAGNOSIS — M79.18 MYOFASCIAL PAIN SYNDROME: ICD-10-CM

## 2025-03-03 DIAGNOSIS — M25.512 CHRONIC LEFT SHOULDER PAIN: Primary | ICD-10-CM

## 2025-03-03 DIAGNOSIS — M54.2 CERVICALGIA: ICD-10-CM

## 2025-03-03 PROCEDURE — 97112 NEUROMUSCULAR REEDUCATION: CPT

## 2025-03-03 PROCEDURE — 97140 MANUAL THERAPY 1/> REGIONS: CPT

## 2025-03-03 PROCEDURE — 97110 THERAPEUTIC EXERCISES: CPT

## 2025-03-03 NOTE — PROGRESS NOTES
"Daily Note     Today's date: 3/3/2025  Patient name: Rosario Green  : 1975  MRN: [id number]  Referring provider: Camila Ashby MD  Dx:   Encounter Diagnosis     ICD-10-CM    1. Chronic left shoulder pain  M25.512     G89.29       2. Bursitis of left shoulder  M75.52       3. Cervicalgia  M54.2       4. Myofascial pain syndrome  M79.18           Start Time: 1450          Subjective: Patient states her pain is a 1/10 in the left shoulder. She feels the exercises are going well at home. Work is going better because she is watching what she has been lifting and doing. She has been able to use her left arm for little, light movements.      Objective: See treatment diary below    Patient's home exercise program updated to include additional exercises. Handout issued and explained with demonstration. She accepted new exercises.     Assessment: Tolerated treatment well. Patient would benefit from continued PT for strengthening and stretching. She was able to add exercises to her program with little difficulty. Patient seemed to understand all education on new exercises. She liked the new exercises because she feel they will help her. Patient felt \"good\" by the end of the session.       Plan: Continue per plan of care.  Progress treatment as tolerated.       Precautions: none noted   Access code: [email or web address]  Progress note: 3/18  POC:     Manuals  3/3    stretching        SOR * 1x3 min KB 1x3 min    Cervical traction * :15x8 KB :15x8    C/S PA and side glide mobs Rotation to L Rotation to L L rotation stretches performed    T/S PA mobs *  nv Muscle energy     Scapular PNF        IASTM        Neuro Re-Ed        UBE    L1 x4 min ALT    scap retraction 10x x10 10x x15    Shoulder rolls 10x x10 10x x10    Chin tucks 10x x10 10x x10    Chin tuck neck ext   10x x10    Supine chin tuck    :05x10 :05x10    MTP/LTP        SNAG L   Used RUE to turn L 10x Used RUE to turn L 10x    Ther Ex        UT " stretch * :15x4 B/L :15x4 B/L :15x4    LS stretch * :15x4 B/L      pendulums 10x ea X10 ea 10x ea X10 ea    C/S AROM        Serratus punch        Prone row    x10    Prone I, T's thumbs up and down    Flex, ext, horz x10 ea    Standing YTI    Y x10    Lat pull down        LT lift off Standing        Table slide flex, scap * X10 ea 10x ea X10 ea    Necks level *ext, rot, chin tuck X10 ea  L rot, ext, chin tuck 10x ea Yellow x10 ea    Thoracic ext    x10                    Ther Activity                        Gait Training                        Modalities                            Access Code: [email or web address]  URL: [email or web address]  Date: 03/03/2025  Prepared by: Nicole Mason    Exercises  - Seated Scapular Retraction  - 3 x daily - 7 x weekly - 1 sets - 10 reps  - Seated Cervical Retraction  - 5 x daily - 7 x weekly - 1 sets - 10 reps - 3 sec hold  - Seated Cervical Retraction and Extension  - 3 x daily - 7 x weekly - 1 sets - 10 reps  - Seated Assisted Cervical Rotation with Towel  - 2 x daily - 7 x weekly - 1 sets - 10 reps  - Circular Shoulder Pendulum with Table Support  - 3 x daily - 7 x weekly - 3 sets - 10 reps  - Flexion-Extension Shoulder Pendulum with Table Support  - 3 x daily - 7 x weekly - 3 sets - 10 reps  - Horizontal Shoulder Pendulum with Table Support  - 3 x daily - 7 x weekly - 3 sets - 10 reps  - Standing Backward Shoulder Rolls  - 3 x daily - 7 x weekly - 3 sets - 10 reps  - Seated Upper Trapezius Stretch  - 2 x daily - 7 x weekly - 1 sets - 4 reps -  sec hold  - Seated Shoulder Flexion Towel Slide at Table Top  - 2 x daily - 7 x weekly - 1 sets - 10 reps - 5-10 sec hold  - Seated Shoulder Scaption Slide at Table Top with Forearm in Neutral  - 2 x daily - 7 x weekly - 1 sets - 10 reps - 5-10 sec hold  - Seated Thoracic Extension AROM  - 2 x daily - 7 x weekly - 1 sets - 10 reps - 3-5 sec hold  - Prone Shoulder Row  - 2 x daily - 7 x weekly - 1 sets - 10 reps  - Prone Shoulder  Extension - Single Arm  - 2 x daily - 7 x weekly - 1 sets - 10 reps  - Prone Shoulder Flexion  - 2 x daily - 7 x weekly - 1 sets - 10 reps  - Prone Shoulder Horizontal Abduction  - 2 x daily - 7 x weekly - 1 sets - 10 reps  - Low Trap Setting at Wall  - 2 x daily - 7 x weekly - 1 sets - 10 reps

## 2025-03-04 ENCOUNTER — APPOINTMENT (OUTPATIENT)
Dept: PHYSICAL THERAPY | Facility: CLINIC | Age: 50
End: 2025-03-04
Payer: COMMERCIAL

## 2025-03-05 ENCOUNTER — OFFICE VISIT (OUTPATIENT)
Dept: PHYSICAL THERAPY | Facility: CLINIC | Age: 50
End: 2025-03-05
Payer: COMMERCIAL

## 2025-03-05 DIAGNOSIS — G89.29 CHRONIC LEFT SHOULDER PAIN: Primary | ICD-10-CM

## 2025-03-05 DIAGNOSIS — M54.2 CERVICALGIA: ICD-10-CM

## 2025-03-05 DIAGNOSIS — M79.18 MYOFASCIAL PAIN SYNDROME: ICD-10-CM

## 2025-03-05 DIAGNOSIS — M75.52 BURSITIS OF LEFT SHOULDER: ICD-10-CM

## 2025-03-05 DIAGNOSIS — M25.512 CHRONIC LEFT SHOULDER PAIN: Primary | ICD-10-CM

## 2025-03-05 PROCEDURE — 97110 THERAPEUTIC EXERCISES: CPT

## 2025-03-05 PROCEDURE — 97112 NEUROMUSCULAR REEDUCATION: CPT

## 2025-03-05 PROCEDURE — 97140 MANUAL THERAPY 1/> REGIONS: CPT

## 2025-03-05 NOTE — PROGRESS NOTES
Daily Note     Today's date: 3/5/2025  Patient name: Rosario Green  : 1975  MRN: [id number]  Referring provider: Camila Ashby MD  Dx:   Encounter Diagnosis     ICD-10-CM    1. Chronic left shoulder pain  M25.512     G89.29       2. Bursitis of left shoulder  M75.52       3. Cervicalgia  M54.2       4. Myofascial pain syndrome  M79.18           Start Time: 1315          Subjective: Patient states she was able to tie her apron behind her back for the first time in 2 months. She is happy things are changing. The exercises are going well at home. She still is limited in ROM      Objective: See treatment diary below      Assessment: Tolerated treatment well. Patient would benefit from continued PT for stretching and strengthening. She was able to add exercises to her program with little difficulty. Verbal cues needed for proper performance of exercises. She has a tendency to shoulder hike shoulder when pulling shoulders backward. Difficulty with initiating  scapular movents during certain exercises. Increased ROM observed compared to last session. Patient felt good leaving department.       Plan: Continue per plan of care.  Progress treatment as tolerated.       Precautions: none noted   Access code: [email or web address]  Progress note: 3/18  POC:     Manuals 2/18 2/24 2/25 3/3 3/5   stretching        SOR * 3x1 min KB 3x1 min 3x1 min   Cervical traction * :15x8 KB :15x8 :15x8   C/S PA and side glide mobs Rotation to L Rotation to L L rotation stretches performed Stretches performed   T/S PA mobs *  nv Muscle energy  Muscle energy   Scapular PNF        IASTM        Neuro Re-Ed        UBE    L1 x4 min ALT L1 x4 min ALT   scap retraction 10x x10 10x x15 x15   Shoulder rolls 10x x10 10x x10    Chin tucks 10x x10 10x x10 x10   Chin tuck neck ext   10x x10 x10   Supine chin tuck    :05x10 :05x10    MTP/LTP     Red x10 ea VC   SNAG L   Used RUE to turn L 10x Used RUE to turn L 10x Used RUE to turn L 10x   Ther Ex         UT stretch * :15x4 B/L :15x4 B/L :15x4 :15x4   LS stretch * :15x4 B/L      pendulums 10x ea X10 ea 10x ea X10 ea    C/S AROM        Serratus punch        Prone row    x10 x10   Prone I, T's thumbs up and down    Flex, ext, horz x10 ea X10 ea all VC   Standing YTI    Y x10 Y,I x10 ea   Lat pull down        LT lift off Standing        Table slide flex, scap * X10 ea 10x ea X10 ea X10 ea   Necks level *ext, rot, chin tuck X10 ea  L rot, ext, chin tuck 10x ea Yellow x10 ea Yellow x15 ea   Thoracic ext    x10 1/2 foam roll x10   Posture correction     Sit x10   Open book     ? NV   Ther Activity                        Gait Training                        Modalities                            Access Code: [email or web address]  URL: [email or web address]  Date: 03/05/2025  Prepared by: Nicole Mason    Exercises  - Seated Scapular Retraction  - 3 x daily - 7 x weekly - 1 sets - 10 reps  - Seated Cervical Retraction  - 5 x daily - 7 x weekly - 1 sets - 10 reps - 3 sec hold  - Seated Cervical Retraction and Extension  - 3 x daily - 7 x weekly - 1 sets - 10 reps  - Seated Assisted Cervical Rotation with Towel  - 2 x daily - 7 x weekly - 1 sets - 10 reps  - Circular Shoulder Pendulum with Table Support  - 3 x daily - 7 x weekly - 3 sets - 10 reps  - Flexion-Extension Shoulder Pendulum with Table Support  - 3 x daily - 7 x weekly - 3 sets - 10 reps  - Horizontal Shoulder Pendulum with Table Support  - 3 x daily - 7 x weekly - 3 sets - 10 reps  - Standing Backward Shoulder Rolls  - 3 x daily - 7 x weekly - 3 sets - 10 reps  - Seated Upper Trapezius Stretch  - 2 x daily - 7 x weekly - 1 sets - 4 reps -  sec hold  - Seated Shoulder Flexion Towel Slide at Table Top  - 2 x daily - 7 x weekly - 1 sets - 10 reps - 5-10 sec hold  - Seated Shoulder Scaption Slide at Table Top with Forearm in Neutral  - 2 x daily - 7 x weekly - 1 sets - 10 reps - 5-10 sec hold  - Seated Thoracic Extension AROM  - 2 x daily - 7 x weekly - 1 sets -  10 reps - 3-5 sec hold  - Prone Shoulder Row  - 2 x daily - 7 x weekly - 1 sets - 10 reps  - Prone Shoulder Extension - Single Arm  - 2 x daily - 7 x weekly - 1 sets - 10 reps  - Prone Shoulder Flexion  - 2 x daily - 7 x weekly - 1 sets - 10 reps  - Prone Shoulder Horizontal Abduction  - 2 x daily - 7 x weekly - 1 sets - 10 reps  - Low Trap Setting at Wall  - 2 x daily - 7 x weekly - 1 sets - 10 reps  - Standing Shoulder Row with Anchored Resistance  - 2 x daily - 7 x weekly - 1 sets - 10 reps  - Shoulder extension with resistance - Neutral  - 2 x daily - 7 x weekly - 1 sets - 10 reps  - Correct Seated Posture  - 2 x daily - 7 x weekly - 1 sets - 10 reps - 10 sec hold

## 2025-03-10 ENCOUNTER — OFFICE VISIT (OUTPATIENT)
Dept: PHYSICAL THERAPY | Facility: CLINIC | Age: 50
End: 2025-03-10
Payer: COMMERCIAL

## 2025-03-10 DIAGNOSIS — M79.18 MYOFASCIAL PAIN SYNDROME: ICD-10-CM

## 2025-03-10 DIAGNOSIS — M25.512 CHRONIC LEFT SHOULDER PAIN: Primary | ICD-10-CM

## 2025-03-10 DIAGNOSIS — M54.2 CERVICALGIA: ICD-10-CM

## 2025-03-10 DIAGNOSIS — G89.29 CHRONIC LEFT SHOULDER PAIN: Primary | ICD-10-CM

## 2025-03-10 DIAGNOSIS — M75.52 BURSITIS OF LEFT SHOULDER: ICD-10-CM

## 2025-03-10 PROCEDURE — 97112 NEUROMUSCULAR REEDUCATION: CPT

## 2025-03-10 PROCEDURE — 97110 THERAPEUTIC EXERCISES: CPT

## 2025-03-10 PROCEDURE — 97140 MANUAL THERAPY 1/> REGIONS: CPT

## 2025-03-10 NOTE — PROGRESS NOTES
"Daily Note     Today's date: 3/10/2025  Patient name: Rosario Green  : 1975  MRN: [id number]  Referring provider: Camila Ashby MD  Dx:   Encounter Diagnosis     ICD-10-CM    1. Chronic left shoulder pain  M25.512     G89.29       2. Bursitis of left shoulder  M75.52       3. Cervicalgia  M54.2       4. Myofascial pain syndrome  M79.18           Start Time: 1455          Subjective: Patient states she has been feeling \"pretty good\". She was able to put a bun in her hair for the first time since the start of the issue. Patient has been able to use the       Objective: See treatment diary below    Patient's home exercise program updated to include additional exercises. Handout declined, but exercises accepted.    Assessment: Tolerated treatment well. Patient would benefit from continued PT for stretching and strengthening. She was able to add a few exercises to her program with few verbal cues. Patient she seemed to understand all education on new exercises. Improved ROM observed with exercises. She liked the open book exercise. Patient felt \"good\" leaving department.      Plan: Continue per plan of care.  Progress treatment as tolerated.       Precautions: none noted   Access code: [email or web address]  Progress note: 3/18  POC:     Manuals 3/10 2/24 2/25 3/3 3/5   stretching        SOR 3x1 min 3x1 min KB 3x1 min 3x1 min   Cervical traction :15x8 :15x8 KB :15x8 :15x8   C/S PA and side glide mobs Stretches performed Rotation to L L rotation stretches performed Stretches performed   T/S PA mobs Muscle energy  nv Muscle energy  Muscle energy   Scapular PNF        IASTM        Neuro Re-Ed        UBE L1 x6 min   L1 x4 min ALT L1 x4 min ALT   scap retraction  x10 10x x15 x15   Shoulder rolls  x10 10x x10 10   Chin tucks x10 x10 10x x10 x10   Chin tuck neck ext x10  10x x10 x10   Supine chin tuck  x10  :05x10 :05x10 x10   MTP/LTP Red x10 ea VC    Red x10 ea VC   SNAG L   Used RUE to turn L 10x Used RUE to turn L 10x " Used RUE to turn L 10x   Ther Ex        UT stretch :15x4 B/L :15x4 B/L :15x4 B/L :15x4 :15x4   LS stretch  :15x4 B/L      pendulums  X10 ea 10x ea X10 ea x10   C/S AROM        Serratus punch        Prone row x10   x10 x10   Prone I, T's thumbs up and down X10 ea all    Flex, ext, horz x10 ea X10 ea all VC   Standing YTI I,Y,T x10 ea   Y x10 Y,I x10 ea   Lat pull down        LT lift off Standing        Table slide flex, scap X10 ea X10 ea 10x ea X10 ea X10 ea   Necks level Yellow x15 ea X10 ea  L rot, ext, chin tuck 10x ea Yellow x10 ea Yellow x15 ea   Thoracic ext 1/2 foam x10 hands on shoulders   x10 1/2 foam roll x10   Posture correction stand x10    Sit x10   Open book X10 B/L    ? NV   Ther Activity                        Gait Training                        Modalities                            Access Code: [email or web address]  URL: [email or web address]  Date: 03/05/2025  Prepared by: Nicole Mason    Exercises  - Seated Scapular Retraction  - 3 x daily - 7 x weekly - 1 sets - 10 reps  - Seated Cervical Retraction  - 5 x daily - 7 x weekly - 1 sets - 10 reps - 3 sec hold  - Seated Cervical Retraction and Extension  - 3 x daily - 7 x weekly - 1 sets - 10 reps  - Seated Assisted Cervical Rotation with Towel  - 2 x daily - 7 x weekly - 1 sets - 10 reps  - Circular Shoulder Pendulum with Table Support  - 3 x daily - 7 x weekly - 3 sets - 10 reps  - Flexion-Extension Shoulder Pendulum with Table Support  - 3 x daily - 7 x weekly - 3 sets - 10 reps  - Horizontal Shoulder Pendulum with Table Support  - 3 x daily - 7 x weekly - 3 sets - 10 reps  - Standing Backward Shoulder Rolls  - 3 x daily - 7 x weekly - 3 sets - 10 reps  - Seated Upper Trapezius Stretch  - 2 x daily - 7 x weekly - 1 sets - 4 reps -  sec hold  - Seated Shoulder Flexion Towel Slide at Table Top  - 2 x daily - 7 x weekly - 1 sets - 10 reps - 5-10 sec hold  - Seated Shoulder Scaption Slide at Table Top with Forearm in Neutral  - 2 x daily - 7 x  weekly - 1 sets - 10 reps - 5-10 sec hold  - Seated Thoracic Extension AROM  - 2 x daily - 7 x weekly - 1 sets - 10 reps - 3-5 sec hold  - Prone Shoulder Row  - 2 x daily - 7 x weekly - 1 sets - 10 reps  - Prone Shoulder Extension - Single Arm  - 2 x daily - 7 x weekly - 1 sets - 10 reps  - Prone Shoulder Flexion  - 2 x daily - 7 x weekly - 1 sets - 10 reps  - Prone Shoulder Horizontal Abduction  - 2 x daily - 7 x weekly - 1 sets - 10 reps  - Low Trap Setting at Wall  - 2 x daily - 7 x weekly - 1 sets - 10 reps  - Standing Shoulder Row with Anchored Resistance  - 2 x daily - 7 x weekly - 1 sets - 10 reps  - Shoulder extension with resistance - Neutral  - 2 x daily - 7 x weekly - 1 sets - 10 reps  - Correct Seated Posture  - 2 x daily - 7 x weekly - 1 sets - 10 reps - 10 sec hold

## 2025-03-11 ENCOUNTER — APPOINTMENT (OUTPATIENT)
Dept: PHYSICAL THERAPY | Facility: CLINIC | Age: 50
End: 2025-03-11
Payer: COMMERCIAL

## 2025-03-12 ENCOUNTER — OFFICE VISIT (OUTPATIENT)
Dept: PHYSICAL THERAPY | Facility: CLINIC | Age: 50
End: 2025-03-12
Payer: COMMERCIAL

## 2025-03-12 DIAGNOSIS — M25.512 CHRONIC LEFT SHOULDER PAIN: Primary | ICD-10-CM

## 2025-03-12 DIAGNOSIS — M75.52 BURSITIS OF LEFT SHOULDER: ICD-10-CM

## 2025-03-12 DIAGNOSIS — M54.2 CERVICALGIA: ICD-10-CM

## 2025-03-12 DIAGNOSIS — G89.29 CHRONIC LEFT SHOULDER PAIN: Primary | ICD-10-CM

## 2025-03-12 DIAGNOSIS — M79.18 MYOFASCIAL PAIN SYNDROME: ICD-10-CM

## 2025-03-12 PROCEDURE — 97140 MANUAL THERAPY 1/> REGIONS: CPT

## 2025-03-12 PROCEDURE — 97112 NEUROMUSCULAR REEDUCATION: CPT

## 2025-03-12 PROCEDURE — 97110 THERAPEUTIC EXERCISES: CPT

## 2025-03-12 NOTE — PROGRESS NOTES
Daily Note     Today's date: 3/12/2025  Patient name: Rosario Green  : 1975  MRN: [id number]  Referring provider: Camila Ashby MD  Dx:   Encounter Diagnosis     ICD-10-CM    1. Chronic left shoulder pain  M25.512     G89.29       2. Bursitis of left shoulder  M75.52       3. Cervicalgia  M54.2       4. Myofascial pain syndrome  M79.18           Start Time: 1445          Subjective: Patient states she always has a little pain that is about a 1/10 in the lateral shoulder. She is liking the open book exercises and are doing them regularly at home.       Objective: See treatment diary below      Assessment: Tolerated treatment well. Patient would benefit from continued PT for stretching and strengthening. She was able to add exercises to her program with little difficulty. Shoulder hiking present with 90 degrees or above movements.Partial correction occurs with verbal cues. Limited ROM continues in left shoulder (so does fear of pain).She seemed to understand all education given on new exercises. Patient felt really good leaving department.      Plan: Continue per plan of care.  Progress treatment as tolerated.       Precautions: none noted   Access code: [email or web address]  Progress note: 3/18  POC:     Manuals 3/10 3/12 2/25 3/3 3/5   stretching  L shoulder stretch performed      SOR 3x1 min 1 min x2 KB 3x1 min 3x1 min   Cervical traction :15x8 :15x6 KB :15x8 :15x8   C/S PA and side glide mobs Stretches performed  L rotation stretches performed Stretches performed   T/S PA mobs Muscle energy Muscle energy nv Muscle energy  Muscle energy   Scapular PNF        IASTM        Neuro Re-Ed        UBE L1 x6 min 90/70 x6 min ALT stand  L1 x4 min ALT L1 x4 min ALT   scap retraction   10x x15 x15   Shoulder rolls   10x x10 10   Chin tucks x10 x10 10x x10 x10   Chin tuck neck ext x10 x10 10x x10 x10   Supine chin tuck  x10  :05x10 :05x10 x10   MTP/LTP Red x10 ea VC Ed x15 ea   Red x10 ea VC   SNAG L Used RUE to turn L  10x Used RUE to turn L 10x Used RUE to turn L 10x Used RUE to turn L 10x Used RUE to turn L 10x   Ther Ex        UT stretch :15x4 B/L  :15x4 B/L :15x4 :15x4   LS stretch        pendulums   10x ea X10 ea x10   C/S AROM        Serratus punch        Prone row x10 x15  x10 x10   Prone I, T's thumbs up and down X10 ea all  X15 ea all  Flex, ext, horz x10 ea X10 ea all VC   Standing YTI I,Y,T x10 ea I,Y,T x10 ea  Y x10 Y,I x10 ea   Lat pull down        Doorway ER        No money  x10      Cane single shld ext  X10 tired      LT lift off Standing        Table slide flex, scap X10 ea X10 ea higher, ER x10 10x ea X10 ea X10 ea   Necks level Yellow x15 ea Yellow x20 ea L rot, ext, chin tuck 10x ea Yellow x10 ea Yellow x15 ea   Thoracic ext 1/2 foam x10 hands on shoulders 1/2 foam x10 hands on shoulders  x10 1/2 foam roll x10   Posture correction stand x10    Sit x10   Open book X10 B/L    ? NV   Ther Activity                        Gait Training                        Modalities                            Access Code: [email or web address]  URL: [email or web address]  Date: 03/05/2025  Prepared by: Nicole Mason    Exercises  - Seated Scapular Retraction  - 3 x daily - 7 x weekly - 1 sets - 10 reps  - Seated Cervical Retraction  - 5 x daily - 7 x weekly - 1 sets - 10 reps - 3 sec hold  - Seated Cervical Retraction and Extension  - 3 x daily - 7 x weekly - 1 sets - 10 reps  - Seated Assisted Cervical Rotation with Towel  - 2 x daily - 7 x weekly - 1 sets - 10 reps  - Circular Shoulder Pendulum with Table Support  - 3 x daily - 7 x weekly - 3 sets - 10 reps  - Flexion-Extension Shoulder Pendulum with Table Support  - 3 x daily - 7 x weekly - 3 sets - 10 reps  - Horizontal Shoulder Pendulum with Table Support  - 3 x daily - 7 x weekly - 3 sets - 10 reps  - Standing Backward Shoulder Rolls  - 3 x daily - 7 x weekly - 3 sets - 10 reps  - Seated Upper Trapezius Stretch  - 2 x daily - 7 x weekly - 1 sets - 4 reps -  sec hold  -  Seated Shoulder Flexion Towel Slide at Table Top  - 2 x daily - 7 x weekly - 1 sets - 10 reps - 5-10 sec hold  - Seated Shoulder Scaption Slide at Table Top with Forearm in Neutral  - 2 x daily - 7 x weekly - 1 sets - 10 reps - 5-10 sec hold  - Seated Thoracic Extension AROM  - 2 x daily - 7 x weekly - 1 sets - 10 reps - 3-5 sec hold  - Prone Shoulder Row  - 2 x daily - 7 x weekly - 1 sets - 10 reps  - Prone Shoulder Extension - Single Arm  - 2 x daily - 7 x weekly - 1 sets - 10 reps  - Prone Shoulder Flexion  - 2 x daily - 7 x weekly - 1 sets - 10 reps  - Prone Shoulder Horizontal Abduction  - 2 x daily - 7 x weekly - 1 sets - 10 reps  - Low Trap Setting at Wall  - 2 x daily - 7 x weekly - 1 sets - 10 reps  - Standing Shoulder Row with Anchored Resistance  - 2 x daily - 7 x weekly - 1 sets - 10 reps  - Shoulder extension with resistance - Neutral  - 2 x daily - 7 x weekly - 1 sets - 10 reps  - Correct Seated Posture  - 2 x daily - 7 x weekly - 1 sets - 10 reps - 10 sec hold

## 2025-03-17 ENCOUNTER — APPOINTMENT (OUTPATIENT)
Dept: PHYSICAL THERAPY | Facility: CLINIC | Age: 50
End: 2025-03-17
Payer: COMMERCIAL

## 2025-03-18 ENCOUNTER — EVALUATION (OUTPATIENT)
Dept: PHYSICAL THERAPY | Facility: CLINIC | Age: 50
End: 2025-03-18
Payer: COMMERCIAL

## 2025-03-18 ENCOUNTER — OFFICE VISIT (OUTPATIENT)
Dept: PAIN MEDICINE | Facility: CLINIC | Age: 50
End: 2025-03-18
Payer: COMMERCIAL

## 2025-03-18 VITALS — WEIGHT: 112 LBS | HEIGHT: 62 IN | BODY MASS INDEX: 20.61 KG/M2

## 2025-03-18 DIAGNOSIS — G89.29 CHRONIC LEFT SHOULDER PAIN: Primary | ICD-10-CM

## 2025-03-18 DIAGNOSIS — M25.512 CHRONIC LEFT SHOULDER PAIN: Primary | ICD-10-CM

## 2025-03-18 DIAGNOSIS — G89.29 CHRONIC LEFT SHOULDER PAIN: ICD-10-CM

## 2025-03-18 DIAGNOSIS — M25.512 CHRONIC LEFT SHOULDER PAIN: ICD-10-CM

## 2025-03-18 DIAGNOSIS — M54.12 CERVICAL RADICULITIS: Primary | ICD-10-CM

## 2025-03-18 DIAGNOSIS — G89.4 CHRONIC PAIN SYNDROME: ICD-10-CM

## 2025-03-18 DIAGNOSIS — M79.18 MYOFASCIAL PAIN SYNDROME: ICD-10-CM

## 2025-03-18 DIAGNOSIS — M75.52 BURSITIS OF LEFT SHOULDER: ICD-10-CM

## 2025-03-18 DIAGNOSIS — M54.2 CERVICALGIA: ICD-10-CM

## 2025-03-18 PROCEDURE — 97110 THERAPEUTIC EXERCISES: CPT | Performed by: PHYSICAL THERAPIST

## 2025-03-18 PROCEDURE — 97112 NEUROMUSCULAR REEDUCATION: CPT | Performed by: PHYSICAL THERAPIST

## 2025-03-18 PROCEDURE — 99214 OFFICE O/P EST MOD 30 MIN: CPT | Performed by: STUDENT IN AN ORGANIZED HEALTH CARE EDUCATION/TRAINING PROGRAM

## 2025-03-18 PROCEDURE — 97164 PT RE-EVAL EST PLAN CARE: CPT | Performed by: PHYSICAL THERAPIST

## 2025-03-18 PROCEDURE — 97140 MANUAL THERAPY 1/> REGIONS: CPT | Performed by: PHYSICAL THERAPIST

## 2025-03-18 RX ORDER — GABAPENTIN 300 MG/1
300 CAPSULE ORAL 3 TIMES DAILY
Qty: 270 CAPSULE | Refills: 0 | Status: SHIPPED | OUTPATIENT
Start: 2025-03-18 | End: 2025-06-16

## 2025-03-18 NOTE — PROGRESS NOTES
PT Re-Evaluation     Today's date: 3/18/2025  Patient name: Rosario Green  : 1975  MRN: [id number]  Referring provider: Camila Ashby MD  Dx:   Encounter Diagnosis     ICD-10-CM    1. Chronic left shoulder pain  M25.512     G89.29       2. Bursitis of left shoulder  M75.52       3. Cervicalgia  M54.2       4. Myofascial pain syndrome  M79.18           Start Time: 1530  Stop Time: 1610  Total time in clinic (min): 40 minutes    Assessment  Impairments: abnormal muscle tone, abnormal or restricted ROM, activity intolerance, impaired physical strength, lacks appropriate home exercise program, pain with function, scapular dyskinesis, poor posture  and poor body mechanics  Functional limitations: lifitng, twisting, carrying, reaching    Assessment details:   Rosario Green was seen for an initial PT evaluation and 7 f/u sessions. Patient is a 49 y.o. female with diagnosis of cervical radiculopathy and shoulder pain and past medical history significant for lupus, hyperparathyroidism with parathyroidectomy, anemia. Findings of examination today shows improvement in left shoulder strength and significant gains with active and passive range of motion as well as reduction in pain intensity and occurrence. Patient would benefit from continuation of services for further progress towards goals. Plan to continue 2x/week for an additional 4 weeks until next re-evaluation.       Goals  STG (6 weeks)  1. Patient will display good seated posture with decreased forward head and retracted shoulders for 2 consecutive sessions with 50% VC. - MET 3/18  2. Patient will have 0/10 cervical pain at rest. - PROGRESSING  3. Improve cervical rotation by 25% for improved ability to drive. - MET 3/18   LTG (12 weeks)  1. Improved FOTO score by 15% for improved improved overall function. - NOT MET  2. Patient will be able to reach overhead with 0/10 pain for return to PLOF. - PROGRESSING  3. Patient will be independent with home  exercise program for continued maintenance post PT DC.-NOT MET       Plan  Patient would benefit from: skilled physical therapy  Planned modality interventions: unattended electrical stimulation, thermotherapy: hydrocollator packs and cryotherapy    Planned therapy interventions: manual therapy, neuromuscular re-education, therapeutic activities, therapeutic exercise, self care and home exercise program    Frequency: 2x week  Duration in weeks: 12  Plan of Care beginning date: 2/18/2025  Plan of Care expiration date: 5/18/2025  Treatment plan discussed with: patient and PTA  Plan details: Progress note in 4 weeks.         Subjective Evaluation    History of Present Illness  Date of onset: 12/18/2024  Subjective 3/18: Patient states 50% improvement since the start of PT. Still feels like range of motion is very limited and if she pushes it will provoke pain although does not linger any more. Shoulder/arm feels weak. Has had significant reduction in pain intensity and occurrence. Increased ease with reaching behind back (tying apron, bra, braid). Saw pain management today who reviewed MRI as noted below. Will hold on injections, and re-filled gabapentin Rx. F/u again in 2 months. Also referred to ortho for shoulder, patient has that appt early April.     MRI IMPRESSION:     Reversal of the cervical lordosis and mild cervical spondylosis with mild spinal stenosis at C6-C7.     Variable degrees of foraminal stenosis, most notably on the right at C4-C5.    Mechanism of injury: Rosario Green is a 49 y.o. female who presents to outpatient Physical Therapy today with complaints of cervical and L shoulder pain. Chronic issues with neck, but about 2 months ago began having more pain with difficulty moving neck that radiated into LUE and is now having difficulty with left shoulder reaching, lifting and quick movements.     Patient Goals  Patient goals for therapy: decreased pain  Patient goal: Improve L shoulder range of  motion, be able to reach overhead or reach behind back  Pain    3/18  Current pain ratin  1  At best pain rating: 3  1  At worst pain rating: 10 4  Location: L sided neck pain into L proximal arm  Quality: burning  Relieving factors: medications, ice and heat  Exacerbated by: lifting, jerky motions, reaching behind back, sleeping on R side.    Social Support    Employment status: working (/waitressing; going to school)  Hand dominance: right          Objective     Concurrent Complaints  Negative for dizziness, headaches, tinnitus, trouble swallowing, difficulty breathing and visual change    Postural Observations    Additional Postural Observation Details  Upper crossed    Tenderness     Left Shoulder   No tenderness in the subacromial bursa and supraspinatus tendon.     Neurological Testing     Sensation   Cervical/Thoracic   Left   Intact: light touch    Right   Intact: light touch    Comments   Left light touch: C5.   3/18: equal bilaterally to light touch    Reflexes   Left   Canas's reflex: negative    Right   Canas's reflex: negative    Active Range of Motion   Cervical/Thoracic Spine       Cervical      3/18    Subcranial retraction:   Restriction level: minimal min  Flexion: 80 degrees      70  Extension: 50 degrees     60  Left lateral flexion: 50 degrees    50  Right lateral flexion: 50 degrees    50  Left rotation: 40 degrees    70  Right rotation: 70 degrees     70    Left Shoulder   Flexion: 65 degrees with pain    105 pain  Extension: 35 degrees with pain   35 pain  Abduction: 35 degrees with pain   85 pain  External rotation 0°: 30 degrees with pain  40 pain  Internal rotation 0°: 20 degrees with pain  30 pain    Right Shoulder   Flexion: 170 degrees   Extension: 65 degrees   Abduction: 170 degrees   External rotation BTH: T4   Internal rotation BTB: T4     Passive Range of Motion   3/18  Left Shoulder   Flexion: 90 degrees    120  Abduction: 45 degrees   85  External rotation 0°: 30  degrees  @0=40, @45=30  Internal rotation 0°: 30 degrees  @0=40  Mechanical Assessment    Cervical    Seated retraction: repeated movements   Pain location: no change  Re-tested with L shoulder flexion  Seated Flexion:  repeated movements  Pain location: no change  Seated Extension: repeated movements  Pain location: no change    Thoracic      Lumbar      Strength/Myotome Testing     Left Shoulder    3/18    Planes of Motion   Flexion: 2+    4-  Abduction: 2+    2+  External rotation at 0°: 2+  4  Internal rotation at 0°: 3-  5    Isolated Muscles   Biceps: 4    5  Triceps: 3-    5    Right Shoulder     Planes of Motion   Flexion: 5   Abduction: 5   External rotation at 0°: 5   Internal rotation at 0°: 5     Isolated Muscles   Biceps: 5   Triceps: 5     Left Wrist/Hand   Wrist extension: 5  Wrist flexion: 5  Thumb extension: 5    Right Wrist/Hand   Wrist extension: 5  Wrist flexion: 5  Thumb extension: 5    Tests   Cervical   Negative vertical compression, alar ligament test, Sharp-Adrienne test and VBI.     Left   Negative Spurling's Test B.     Right   Negative Spurling's Test B.     Left Shoulder   Positive Hawkin's.   Negative ULTT1, ULTT3 and ULTT4.     Lumbar   Negative vertical compression.     Additional Tests Details  Nguyen positive for pain, located at proximal arm not shoulder.   3/18: Same as IE    NTT unable to achieve appropriate end range due to pain, unchanged with decreased proximal tension.     General Comments:      Cervical/Thoracic Comments    FOTO: 47% function, 66% predicted function   3/18: 47%    Neuro Exam:     Headaches   Patient reports headaches: No.              Precautions: none noted   Access code: [email or web address]  Progress note: 4/18  POC: 5/18    Manuals 3/10 3/12 3/18- HEP review 3/3 3/5   stretching  L shoulder stretch performed      SOR 3x1 min 1 min x2  3x1 min 3x1 min   Cervical traction :15x8 :15x6  :15x8 :15x8   C/S PA and side glide mobs Stretches performed   stretches performed  Stretches performed   T/S PA mobs Muscle energy Muscle energy  Muscle energy  Muscle energy   Scapular PNF        IASTM        Neuro Re-Ed        UBE L1 x6 min 90/70 x6 min ALT stand 90/70 x6 min ALT stand L1 x4 min ALT L1 x4 min ALT   scap retraction    x15 x15   Shoulder rolls    x10 10   Chin tucks x10 x10  x10 x10   Chin tuck neck ext x10 x10  x10 x10   Supine chin tuck  x10   :05x10 x10   MTP/LTP Red x10 ea VC Ed x15 ea   Red x10 ea VC   SNAG L Used RUE to turn L 10x Used RUE to turn L 10x  Used RUE to turn L 10x Used RUE to turn L 10x   Ther Ex        UT stretch :15x4 B/L   :15x4 :15x4   LS stretch        pendulums    X10 ea x10   C/S AROM        Serratus punch        Prone row x10 x15  x10 x10   Prone I, T's thumbs up and down X10 ea all  X15 ea all  Flex, ext, horz x10 ea X10 ea all VC   Standing YTI I,Y,T x10 ea I,Y,T x10 ea  Y x10 Y,I x10 ea   Lat pull down        Doorway ER        No money  x10      Cane single shld ext  X10 tired      LT lift off Standing        Table slide flex, scap X10 ea X10 ea higher, ER x10  X10 ea X10 ea   Necks level Yellow x15 ea Yellow x20 ea  Yellow x10 ea Yellow x15 ea   Thoracic ext 1/2 foam x10 hands on shoulders 1/2 foam x10 hands on shoulders 1/2 foam x10 hands on shoulders x10 1/2 foam roll x10   Posture correction stand x10    Sit x10   Open book X10 B/L    ? NV   Ther Activity                        Gait Training                        Modalities                            Access Code: [email or web address]  URL: [email or web address]  Date: 03/05/2025  Prepared by: Nicole Mason    Exercises  - Seated Scapular Retraction  - 3 x daily - 7 x weekly - 1 sets - 10 reps  - Seated Cervical Retraction  - 5 x daily - 7 x weekly - 1 sets - 10 reps - 3 sec hold  - Seated Cervical Retraction and Extension  - 3 x daily - 7 x weekly - 1 sets - 10 reps  - Seated Assisted Cervical Rotation with Towel  - 2 x daily - 7 x weekly - 1 sets - 10 reps  - Circular Shoulder Pendulum  with Table Support  - 3 x daily - 7 x weekly - 3 sets - 10 reps  - Flexion-Extension Shoulder Pendulum with Table Support  - 3 x daily - 7 x weekly - 3 sets - 10 reps  - Horizontal Shoulder Pendulum with Table Support  - 3 x daily - 7 x weekly - 3 sets - 10 reps  - Standing Backward Shoulder Rolls  - 3 x daily - 7 x weekly - 3 sets - 10 reps  - Seated Upper Trapezius Stretch  - 2 x daily - 7 x weekly - 1 sets - 4 reps -  sec hold  - Seated Shoulder Flexion Towel Slide at Table Top  - 2 x daily - 7 x weekly - 1 sets - 10 reps - 5-10 sec hold  - Seated Shoulder Scaption Slide at Table Top with Forearm in Neutral  - 2 x daily - 7 x weekly - 1 sets - 10 reps - 5-10 sec hold  - Seated Thoracic Extension AROM  - 2 x daily - 7 x weekly - 1 sets - 10 reps - 3-5 sec hold  - Prone Shoulder Row  - 2 x daily - 7 x weekly - 1 sets - 10 reps  - Prone Shoulder Extension - Single Arm  - 2 x daily - 7 x weekly - 1 sets - 10 reps  - Prone Shoulder Flexion  - 2 x daily - 7 x weekly - 1 sets - 10 reps  - Prone Shoulder Horizontal Abduction  - 2 x daily - 7 x weekly - 1 sets - 10 reps  - Low Trap Setting at Wall  - 2 x daily - 7 x weekly - 1 sets - 10 reps  - Standing Shoulder Row with Anchored Resistance  - 2 x daily - 7 x weekly - 1 sets - 10 reps  - Shoulder extension with resistance - Neutral  - 2 x daily - 7 x weekly - 1 sets - 10 reps  - Correct Seated Posture  - 2 x daily - 7 x weekly - 1 sets - 10 reps - 10 sec hold

## 2025-03-18 NOTE — PROGRESS NOTES
"Assessment:  1. Cervical radiculitis    2. Chronic left shoulder pain    3. Chronic pain syndrome        Portions of the record may have been created with voice recognition software. Occasional wrong word or \"sound a like\" substitutions may have occurred due to the inherent limitations of voice recognition software. Read the chart carefully and recognize, using context, where substitutions have occurred. Contact me with any questions.      Plan:  49 y o f here for a follow up visit with regards to chronic neck and LUE pain symptoms.  Since last visit, she underwent a cervical spine MRI which shows multilevel DDD with mild multilevel canal and foraminal narrowing.  She notes significant improvement in symptoms with physical therapy and gabapentin regimen.  Denies new or progressive weakness, balance/gait issues.  She continues to note limitations with left shoulder range of motion despite improvement in pain and paresthesias.      Discussed option of cervical epidural steroid injection for symptom management but deferred at this time due to significant improvement in pain symptoms with conservative treatment including PT, gabapentin.    Referral to orthopedics provided for further evaluation of impaired left shoulder range of motion despite improvement in pain symptoms and continued PT.    Continue gabapentin 3 mg 3 times daily.  Refill provided today.    Follow-up in 2 to 3 months or as needed.      My impressions and treatment recommendations were discussed in detail with the patient who verbalized understanding and had no further questions.  Discharge instructions were provided. I personally saw and examined the patient and I agree with the above discussed plan of care.    Orders Placed This Encounter   Procedures    Ambulatory Referral to Orthopedic Surgery     Standing Status:   Future     Expiration Date:   3/18/2026     Referral Priority:   Routine     Referral Type:   Consult - AMB     Referral Reason:   " Specialty Services Required     Referred to Provider:   Jasen Israel MD     Requested Specialty:   Orthopedic Surgery     Number of Visits Requested:   1     Expiration Date:   3/18/2026     New Medications Ordered This Visit   Medications    gabapentin (Neurontin) 300 mg capsule     Sig: Take 1 capsule (300 mg total) by mouth 3 (three) times a day     Dispense:  270 capsule     Refill:  0       History of Present Illness:  Rosario Green is a 49 y.o. female who presents for a follow up office visit in regards to Shoulder Pain (left).     I have personally reviewed and/or updated the patient's past medical history, past surgical history, family history, social history, current medications, allergies, and vital signs today.     Review of Systems   Constitutional:  Negative for fever.   HENT:  Negative for sinus pain.    Eyes:  Negative for redness.   Respiratory:  Negative for shortness of breath.    Cardiovascular:  Negative for palpitations.   Gastrointestinal:  Negative for abdominal pain and nausea.   Endocrine: Negative for polydipsia.   Genitourinary:  Negative for difficulty urinating.   Musculoskeletal:  Positive for arthralgias and myalgias. Negative for gait problem.        Decreased ROM  Pain in extremity-L arm   Skin:  Negative for rash.   Neurological:  Negative for seizures and headaches.   Psychiatric/Behavioral:  Negative for decreased concentration. The patient is not nervous/anxious.    All other systems reviewed and are negative.      Patient Active Problem List   Diagnosis    Iron deficiency anemia    Lupus    Bleeding hemorrhoid       Past Medical History:   Diagnosis Date    Hyperparathyroidism (HCC)     Lupus        Past Surgical History:   Procedure Laterality Date    BREAST BIOPSY Left 2019    benign    PARATHYROIDECTOMY      2020       Family History   Problem Relation Age of Onset    Multiple sclerosis Mother     Colon cancer Father     No Known Problems Daughter     No Known Problems  "Daughter     No Known Problems Maternal Grandmother     No Known Problems Paternal Grandmother     Colon cancer Paternal Aunt     Colon cancer Paternal Aunt        Social History     Occupational History    Not on file   Tobacco Use    Smoking status: Never    Smokeless tobacco: Never   Vaping Use    Vaping status: Never Used   Substance and Sexual Activity    Alcohol use: Not Currently    Drug use: Never    Sexual activity: Not Currently       Current Outpatient Medications on File Prior to Visit   Medication Sig    lidocaine (Lidoderm) 5 % Apply 1 patch topically over 12 hours daily Remove & Discard patch within 12 hours or as directed by MD    methocarbamol (ROBAXIN) 500 mg tablet Take 1 tablet (500 mg total) by mouth 2 (two) times a day as needed for muscle spasms    naproxen (Naprosyn) 500 mg tablet Take 1 tablet (500 mg total) by mouth 2 (two) times a day with meals (Patient taking differently: Take 500 mg by mouth if needed)    cyclobenzaprine (FLEXERIL) 10 mg tablet Take 1 tablet (10 mg total) by mouth 2 (two) times a day as needed for muscle spasms (Patient not taking: Reported on 1/6/2025)    methylPREDNISolone 4 MG tablet therapy pack Use as directed on package (Patient not taking: Reported on 3/18/2025)     No current facility-administered medications on file prior to visit.       Allergies   Allergen Reactions    Penicillin G Anaphylaxis    Penicillins Anaphylaxis    Sulfamethoxazole Fever     itching    Ciprofloxacin Itching and Anxiety    Sulfa Antibiotics Rash       Physical Exam:    Ht 5' 2\" (1.575 m)   Wt 50.8 kg (112 lb)   LMP 10/11/2023 (Exact Date)   BMI 20.49 kg/m²     Constitutional:normal, well developed, well nourished, alert, in no distress and non-toxic and no overt pain behavior.  Eyes:anicteric  HEENT:grossly intact  Neck:supple, symmetric, trachea midline and no masses   Pulmonary:even and unlabored  Cardiovascular:No edema or pitting edema present  Skin:Normal without rashes or " lesions and well hydrated  Psychiatric:Mood and affect appropriate  Neurologic:Cranial Nerves II-XII grossly intact  Musculoskeletal:normal gait, limited end range active L shoulder ROM in all directions      Imaging    MRI CERVICAL SPINE WITHOUT CONTRAST     INDICATION: M54.12: Radiculopathy, cervical region.   Neck pain, limited range of motion and pain in the left shoulder and arm.     COMPARISON: X-ray of the cervical spine from December 16, 2024     TECHNIQUE:  Multiplanar, multisequence imaging of the cervical spine was performed. .        IMAGE QUALITY:  Diagnostic     FINDINGS:     ALIGNMENT: Reversal of the cervical lordosis. Trace 1 to 2 mm of posterior subluxation of C6 on C7 is thought to be degenerative in etiology     MARROW SIGNAL: Mixed type I and type II Modic endplate degenerative changes at C6-C7.     CERVICAL AND VISUALIZED THORACIC CORD:  Normal signal within the visualized cord.     PREVERTEBRAL AND PARASPINAL SOFT TISSUES:  Normal.     VISUALIZED POSTERIOR FOSSA:  The visualized posterior fossa demonstrates no abnormal signal.     CERVICAL DISC SPACES:     C2-C3:  Normal.     C3-C4: Bilateral uncovertebral hypertrophy with mild left foraminal stenosis.     C4-C5: Right foraminal herniation with moderate right foraminal stenosis. Mild endplate osteophytic ridging and left uncovertebral hypertrophy with mild left foraminal stenosis. No spinal stenosis.     C5-C6: Right central protrusion, posterior osteophytic ridging and left uncovertebral hypertrophy. No spinal stenosis. Mild left foraminal stenosis.     C6-C7: Posterior disc osteophyte complex with mild bilateral foraminal stenosis. Mild buckling of the ligamentum flavum and mild spinal stenosis.     C7-T1:  Normal.     UPPER THORACIC DISC SPACES:  Normal.     OTHER FINDINGS:  None.     IMPRESSION:     Reversal of the cervical lordosis and mild cervical spondylosis with mild spinal stenosis at C6-C7.     Variable degrees of foraminal  stenosis, most notably on the right at C4-C5.     No cord signal abnormality.

## 2025-03-18 NOTE — LETTER
To Whom it May Concern,     Rosario Green is currently under my professional care in Physical Therapy with a diagnosis of left shoulder pain, left shoulder bursitis, cervicalgia, and myofascial pain syndrome. At the time of re-evaluation, Rosario showed improvement, but continued limitation with left shoulder strength, range of motion and stability as well as limited cervical mobility and range of motion with central and radicular pain symptoms currently impacting her ability to complete lifting, carrying, pushing, pulling, and repetitive upper extremity tasks. At this time it is recommended she avoid pain-provoking activities as the will impede and prolong her recovery.     Any questions please contact me at the above number.     Thank you,         Edna Egan PT, DPT, ATC

## 2025-03-24 ENCOUNTER — OFFICE VISIT (OUTPATIENT)
Dept: PHYSICAL THERAPY | Facility: CLINIC | Age: 50
End: 2025-03-24
Payer: COMMERCIAL

## 2025-03-24 DIAGNOSIS — M25.512 CHRONIC LEFT SHOULDER PAIN: Primary | ICD-10-CM

## 2025-03-24 DIAGNOSIS — M75.52 BURSITIS OF LEFT SHOULDER: ICD-10-CM

## 2025-03-24 DIAGNOSIS — M54.2 CERVICALGIA: ICD-10-CM

## 2025-03-24 DIAGNOSIS — M79.18 MYOFASCIAL PAIN SYNDROME: ICD-10-CM

## 2025-03-24 DIAGNOSIS — G89.29 CHRONIC LEFT SHOULDER PAIN: Primary | ICD-10-CM

## 2025-03-24 PROCEDURE — 97140 MANUAL THERAPY 1/> REGIONS: CPT

## 2025-03-24 PROCEDURE — 97112 NEUROMUSCULAR REEDUCATION: CPT

## 2025-03-24 PROCEDURE — 97110 THERAPEUTIC EXERCISES: CPT

## 2025-03-24 RX ORDER — LIDOCAINE 50 MG/G
1 PATCH TOPICAL DAILY
Qty: 30 PATCH | Refills: 2 | Status: SHIPPED | OUTPATIENT
Start: 2025-03-24

## 2025-03-24 NOTE — PROGRESS NOTES
"Daily Note     Today's date: 3/24/2025  Patient name: Rosario Green  : 1975  MRN: [id number]  Referring provider: Camila Ashby MD  Dx:   Encounter Diagnosis     ICD-10-CM    1. Chronic left shoulder pain  M25.512     G89.29       2. Bursitis of left shoulder  M75.52       3. Cervicalgia  M54.2       4. Myofascial pain syndrome  M79.18                      Subjective: Pt reports she is doing okay, she has been trying to do more reaching and extension within her limits.       Objective: See treatment diary below      Assessment: Tolerated treatment well. Pt performed all exercises without issue, continue to progress to tolerance. Pt continues to be challenged by shoulder ROM exercises, limited in all planes. Pt would benefit from continued focus on stretching, strengthening, and ROM exercises to improve function and decrease limitations. Patient demonstrated fatigue post treatment, exhibited good technique with therapeutic exercises, and would benefit from continued PT      Plan: Continue per plan of care.      Precautions: none noted   Access code: [email or web address]  Progress note:   POC:     Manuals 3/10 3/12 3/18- HEP review 3/24 3   stretching  L shoulder stretch performed  L shoulder PROM    SOR 3x1 min 1 min x2  1 min x2 3x1 min   Cervical traction :15x8 :15x6  :15x6 :15x8   C/S PA and side glide mobs Stretches performed    Stretches performed   T/S PA mobs Muscle energy Muscle energy   Muscle energy   Scapular PNF        IASTM        Neuro Re-Ed        UBE L1 x6 min 90/70 x6 min ALT stand 90/70 x6 min ALT stand 90/70 x6min ALT stand L1 x4 min ALT   scap retraction     x15   Shoulder rolls     10   Chin tucks x10 x10  x10 x10   Chin tuck neck ext x10 x10  x10 x10   Supine chin tuck  x10    x10   MTP/LTP Red x10 ea VC Ed x15 ea   Red x10 ea VC   SNAG L Used RUE to turn L 10x Used RUE to turn L 10x  Used RUE to turn L 10x Used RUE to turn L 10x   Ther Ex        UT stretch :15x4 B/L   5x10\" B/L " :15x4   LS stretch        pendulums     x10   C/S AROM        Serratus punch        Prone row x10 x15   x10   Prone I, T's thumbs up and down X10 ea all  X15 ea all  X15 ea all X10 ea all VC   Standing YTI I,Y,T x10 ea I,Y,T x10 ea  I,Y,T x10 ea Y,I x10 ea   Lat pull down        Doorway ER        No money  x10  x10    Cane single shld ext  X10 tired      LT lift off Standing        Table slide flex, scap X10 ea X10 ea higher, ER x10  X10 ea higher, ER x10 X10 ea   Necks level Yellow x15 ea Yellow x20 ea  nv Yellow x15 ea   Thoracic ext 1/2 foam x10 hands on shoulders 1/2 foam x10 hands on shoulders  1/2 foam x10 hands on shoulders 1/2 foam roll x10   Posture correction stand x10    Sit x10   Open book X10 B/L    ? NV   Ther Activity                        Gait Training                        Modalities                            Access Code: [email or web address]  URL: [email or web address]  Date: 03/05/2025  Prepared by: Nicole Mason    Exercises  - Seated Scapular Retraction  - 3 x daily - 7 x weekly - 1 sets - 10 reps  - Seated Cervical Retraction  - 5 x daily - 7 x weekly - 1 sets - 10 reps - 3 sec hold  - Seated Cervical Retraction and Extension  - 3 x daily - 7 x weekly - 1 sets - 10 reps  - Seated Assisted Cervical Rotation with Towel  - 2 x daily - 7 x weekly - 1 sets - 10 reps  - Circular Shoulder Pendulum with Table Support  - 3 x daily - 7 x weekly - 3 sets - 10 reps  - Flexion-Extension Shoulder Pendulum with Table Support  - 3 x daily - 7 x weekly - 3 sets - 10 reps  - Horizontal Shoulder Pendulum with Table Support  - 3 x daily - 7 x weekly - 3 sets - 10 reps  - Standing Backward Shoulder Rolls  - 3 x daily - 7 x weekly - 3 sets - 10 reps  - Seated Upper Trapezius Stretch  - 2 x daily - 7 x weekly - 1 sets - 4 reps -  sec hold  - Seated Shoulder Flexion Towel Slide at Table Top  - 2 x daily - 7 x weekly - 1 sets - 10 reps - 5-10 sec hold  - Seated Shoulder Scaption Slide at Table Top with Forearm  in Neutral  - 2 x daily - 7 x weekly - 1 sets - 10 reps - 5-10 sec hold  - Seated Thoracic Extension AROM  - 2 x daily - 7 x weekly - 1 sets - 10 reps - 3-5 sec hold  - Prone Shoulder Row  - 2 x daily - 7 x weekly - 1 sets - 10 reps  - Prone Shoulder Extension - Single Arm  - 2 x daily - 7 x weekly - 1 sets - 10 reps  - Prone Shoulder Flexion  - 2 x daily - 7 x weekly - 1 sets - 10 reps  - Prone Shoulder Horizontal Abduction  - 2 x daily - 7 x weekly - 1 sets - 10 reps  - Low Trap Setting at Wall  - 2 x daily - 7 x weekly - 1 sets - 10 reps  - Standing Shoulder Row with Anchored Resistance  - 2 x daily - 7 x weekly - 1 sets - 10 reps  - Shoulder extension with resistance - Neutral  - 2 x daily - 7 x weekly - 1 sets - 10 reps  - Correct Seated Posture  - 2 x daily - 7 x weekly - 1 sets - 10 reps - 10 sec hold

## 2025-03-26 ENCOUNTER — APPOINTMENT (OUTPATIENT)
Dept: PHYSICAL THERAPY | Facility: CLINIC | Age: 50
End: 2025-03-26
Payer: COMMERCIAL

## 2025-03-31 ENCOUNTER — OFFICE VISIT (OUTPATIENT)
Dept: PHYSICAL THERAPY | Facility: CLINIC | Age: 50
End: 2025-03-31
Payer: COMMERCIAL

## 2025-03-31 DIAGNOSIS — M25.512 CHRONIC LEFT SHOULDER PAIN: Primary | ICD-10-CM

## 2025-03-31 DIAGNOSIS — M79.18 MYOFASCIAL PAIN SYNDROME: ICD-10-CM

## 2025-03-31 DIAGNOSIS — M54.2 CERVICALGIA: ICD-10-CM

## 2025-03-31 DIAGNOSIS — G89.29 CHRONIC LEFT SHOULDER PAIN: Primary | ICD-10-CM

## 2025-03-31 DIAGNOSIS — M75.52 BURSITIS OF LEFT SHOULDER: ICD-10-CM

## 2025-03-31 PROCEDURE — 97112 NEUROMUSCULAR REEDUCATION: CPT

## 2025-03-31 PROCEDURE — 97110 THERAPEUTIC EXERCISES: CPT

## 2025-03-31 PROCEDURE — 97140 MANUAL THERAPY 1/> REGIONS: CPT

## 2025-03-31 NOTE — PROGRESS NOTES
"Daily Note     Today's date: 3/31/2025  Patient name: Rosario Green  : 1975  MRN: [id number]  Referring provider: Camila Ashby MD  Dx:   Encounter Diagnosis     ICD-10-CM    1. Chronic left shoulder pain  M25.512     G89.29       2. Bursitis of left shoulder  M75.52       3. Cervicalgia  M54.2       4. Myofascial pain syndrome  M79.18           Start Time: 1530          Subjective: Patient is having no pain. She is now able to lift a water bottle off her night stand without help from the other arm. She has not been having pain since she is working the front of the house and not in the back. She is still worried she may have a frozen shoulder and will be going to a orthopedist to check her left shoulder.       Objective: See treatment diary below      Assessment: Tolerated treatment well. Patient would benefit from continued PT for stretching and strengthening. She was able to perform her exercises with few verbal cues for proper performance of exercises. Patient felt \"good\" leaving department.      Plan: Continue per plan of care.  Progress treatment as tolerated.       Precautions: none noted   Access code: [email or web address]  Progress note:   POC:     Manuals 3/10 3/12 3/18- HEP review 3/24 3/31   stretching  L shoulder stretch performed  L shoulder PROM L shoulder PROM   SOR 3x1 min 1 min x2  1 min x2 1 min x2   Cervical traction :15x8 :15x6  :15x6 :15x6   C/S PA and side glide mobs Stretches performed       T/S PA mobs Muscle energy Muscle energy      Scapular PNF        IASTM        Neuro Re-Ed        UBE L1 x6 min 90/70 x6 min ALT stand 90/70 x6 min ALT stand 90/70 x6min ALT stand L1 x6 min   scap retraction        Shoulder rolls        Chin tucks x10 x10  x10 x10   Chin tuck neck ext x10 x10  x10 x10   Supine chin tuck  x10       MTP/LTP Red x10 ea VC Ed x15 ea      SNAG L Used RUE to turn L 10x Used RUE to turn L 10x  Used RUE to turn L 10x Used RUE to turn L 10x   Ther Ex        UT stretch " ":15x4 B/L   5x10\" B/L :10 x5 B/L O.P.   LS stretch        pendulums        C/S AROM        Serratus punch        Prone row x10 x15      Prone I, T's thumbs up and down X10 ea all  X15 ea all  X15 ea all X15 ea all   Standing YTI I,Y,T x10 ea I,Y,T x10 ea  I,Y,T x10 ea I,Y,T x10 ea   Lat pull down        Doorway ER        No money  x10  x10 x15   Cane single shld ext  X10 tired      LT lift off Standing        Table slide flex, scap X10 ea X10 ea higher, ER x10  X10 ea higher, ER x10 X10 ea higher, ER x10   Necks level Yellow x15 ea Yellow x20 ea  nv Yellow x20 ea   Thoracic ext 1/2 foam x10 hands on shoulders 1/2 foam x10 hands on shoulders  1/2 foam x10 hands on shoulders 1/2 foam x10 hands on shoulders   Posture correction stand x10       Open book X10 B/L       Ther Activity                        Gait Training                        Modalities                            Access Code: [email or web address]  URL: [email or web address]  Date: 03/05/2025  Prepared by: Nicole Mason    Exercises  - Seated Scapular Retraction  - 3 x daily - 7 x weekly - 1 sets - 10 reps  - Seated Cervical Retraction  - 5 x daily - 7 x weekly - 1 sets - 10 reps - 3 sec hold  - Seated Cervical Retraction and Extension  - 3 x daily - 7 x weekly - 1 sets - 10 reps  - Seated Assisted Cervical Rotation with Towel  - 2 x daily - 7 x weekly - 1 sets - 10 reps  - Circular Shoulder Pendulum with Table Support  - 3 x daily - 7 x weekly - 3 sets - 10 reps  - Flexion-Extension Shoulder Pendulum with Table Support  - 3 x daily - 7 x weekly - 3 sets - 10 reps  - Horizontal Shoulder Pendulum with Table Support  - 3 x daily - 7 x weekly - 3 sets - 10 reps  - Standing Backward Shoulder Rolls  - 3 x daily - 7 x weekly - 3 sets - 10 reps  - Seated Upper Trapezius Stretch  - 2 x daily - 7 x weekly - 1 sets - 4 reps -  sec hold  - Seated Shoulder Flexion Towel Slide at Table Top  - 2 x daily - 7 x weekly - 1 sets - 10 reps - 5-10 sec hold  - Seated " Shoulder Scaption Slide at Table Top with Forearm in Neutral  - 2 x daily - 7 x weekly - 1 sets - 10 reps - 5-10 sec hold  - Seated Thoracic Extension AROM  - 2 x daily - 7 x weekly - 1 sets - 10 reps - 3-5 sec hold  - Prone Shoulder Row  - 2 x daily - 7 x weekly - 1 sets - 10 reps  - Prone Shoulder Extension - Single Arm  - 2 x daily - 7 x weekly - 1 sets - 10 reps  - Prone Shoulder Flexion  - 2 x daily - 7 x weekly - 1 sets - 10 reps  - Prone Shoulder Horizontal Abduction  - 2 x daily - 7 x weekly - 1 sets - 10 reps  - Low Trap Setting at Wall  - 2 x daily - 7 x weekly - 1 sets - 10 reps  - Standing Shoulder Row with Anchored Resistance  - 2 x daily - 7 x weekly - 1 sets - 10 reps  - Shoulder extension with resistance - Neutral  - 2 x daily - 7 x weekly - 1 sets - 10 reps  - Correct Seated Posture  - 2 x daily - 7 x weekly - 1 sets - 10 reps - 10 sec hold

## 2025-04-02 ENCOUNTER — APPOINTMENT (OUTPATIENT)
Dept: PHYSICAL THERAPY | Facility: CLINIC | Age: 50
End: 2025-04-02
Payer: COMMERCIAL

## 2025-04-07 ENCOUNTER — OFFICE VISIT (OUTPATIENT)
Dept: OBGYN CLINIC | Facility: CLINIC | Age: 50
End: 2025-04-07
Payer: COMMERCIAL

## 2025-04-07 ENCOUNTER — OFFICE VISIT (OUTPATIENT)
Dept: PHYSICAL THERAPY | Facility: CLINIC | Age: 50
End: 2025-04-07
Payer: COMMERCIAL

## 2025-04-07 VITALS — HEIGHT: 62 IN | BODY MASS INDEX: 20.24 KG/M2 | WEIGHT: 110 LBS

## 2025-04-07 DIAGNOSIS — G89.29 CHRONIC LEFT SHOULDER PAIN: ICD-10-CM

## 2025-04-07 DIAGNOSIS — M79.18 MYOFASCIAL PAIN SYNDROME: ICD-10-CM

## 2025-04-07 DIAGNOSIS — M25.512 CHRONIC LEFT SHOULDER PAIN: Primary | ICD-10-CM

## 2025-04-07 DIAGNOSIS — M25.512 CHRONIC LEFT SHOULDER PAIN: ICD-10-CM

## 2025-04-07 DIAGNOSIS — M75.02 ADHESIVE CAPSULITIS OF LEFT SHOULDER: Primary | ICD-10-CM

## 2025-04-07 DIAGNOSIS — M54.2 CERVICALGIA: ICD-10-CM

## 2025-04-07 DIAGNOSIS — G89.29 CHRONIC LEFT SHOULDER PAIN: Primary | ICD-10-CM

## 2025-04-07 DIAGNOSIS — G89.4 CHRONIC PAIN SYNDROME: ICD-10-CM

## 2025-04-07 DIAGNOSIS — M75.52 BURSITIS OF LEFT SHOULDER: ICD-10-CM

## 2025-04-07 PROCEDURE — 99204 OFFICE O/P NEW MOD 45 MIN: CPT | Performed by: STUDENT IN AN ORGANIZED HEALTH CARE EDUCATION/TRAINING PROGRAM

## 2025-04-07 PROCEDURE — 97140 MANUAL THERAPY 1/> REGIONS: CPT

## 2025-04-07 PROCEDURE — 97112 NEUROMUSCULAR REEDUCATION: CPT

## 2025-04-07 PROCEDURE — 97110 THERAPEUTIC EXERCISES: CPT

## 2025-04-07 NOTE — PROGRESS NOTES
[facility] Clinic Visit       Assessment & Plan  Adhesive capsulitis of left shoulder    Orders:    Ambulatory Referral to Orthopedic Surgery    Ambulatory Referral to Orthopedic Surgery; Future    I reviewed the history, physical exam, and imaging with the patient in clinic today.  Patient complains of pain and stiffness in the left shoulder for 2 months without injury. On exam, patient has limited active and passive range of motion.  Discussed that the patient's condition is consistent with adhesive capsulitis given the limited active and passive range of motion of the shoulder.  Discussed the etiology of adhesive capsulitis and potential treatment options. Discussed with the patient that a corticosteroid injection under ultrasound guidance into the glenohumeral joint followed by physical therapy focusing on shoulder range of motion would be recommended. Patient was provided with a prescription for meloxicam and instructed to take it with food. Patient was instructed to discontinue the meloxicam if any GI symptoms develop.  Patient was provided with with a referral for the ultrasound-guided glenohumeral corticosteroid injection.  Patient was provided a prescription for physical therapy and instructed to begin treatment 3-4 days after receiving the ultrasound-guided glenohumeral joint corticosteroid injection. Discussed that most patients have good response to this treatment with return of full range of motion.  However, some patients do require a second injection at approximately 3 months to fully regain range of motion.  Discussed that in very rare instances, surgery is needed to do a capsular release arthroscopically. Patient will follow-up in 6 weeks for repeat evaluation. The patient demonstrated understanding of the discussion and was in agreement with the plan.  All of the questions were answered.  Patient can reach out to clinic with any questions or concerns at any time.     Follow up: 6  weeks  Imaging: none      Chief Complaint   Patient presents with    Left Shoulder - Pain         History of Present Illness:  The patient is a 50 y.o. RHD female seen in clinic for left shoulder pain.  Patient states that the symptoms started proximately 2 months ago.  States that she noticed sharp pain around the shoulder along with burning radiate down to the elbow.  She was evaluated by emergency department as well as spine and pain with concern for cervical pathology.  She subsequently noticed limited range of motion of the shoulder.  States that her pain has improved but she continues to have limited range of motion.  She states that sleep aggravates her symptoms.  She has been doing physical therapy for her neck and shoulder and does feel like pain is improved but her range of motion has remained the same.  Patient does have a history of lupus.    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    The patient has the following co-morbidities: cervical degenerative disc disease, lupus, family of history of Bruno Danlos    Shoulder Surgical History:  None    Past Medical, Social and Family History:  Past Medical History:   Diagnosis Date    Hyperparathyroidism (HCC)     Lupus      Past Surgical History:   Procedure Laterality Date    BREAST BIOPSY Left 2019    benign    PARATHYROIDECTOMY      2020     Allergies   Allergen Reactions    Penicillin G Anaphylaxis    Penicillins Anaphylaxis    Sulfamethoxazole Fever     itching    Ciprofloxacin Itching and Anxiety    Sulfa Antibiotics Rash     Current Outpatient Medications on File Prior to Visit   Medication Sig Dispense Refill    gabapentin (Neurontin) 300 mg capsule Take 1 capsule (300 mg total) by mouth 3 (three) times a day 270 capsule 0    lidocaine (Lidoderm) 5 % Apply 1 patch topically over 12 hours daily Remove & Discard patch within 12 hours or as directed by MD 30 patch 2    cyclobenzaprine (FLEXERIL) 10 mg tablet Take 1 tablet (10 mg total) by mouth 2  (two) times a day as needed for muscle spasms (Patient not taking: Reported on 1/6/2025) 20 tablet 0    methocarbamol (ROBAXIN) 500 mg tablet Take 1 tablet (500 mg total) by mouth 2 (two) times a day as needed for muscle spasms (Patient not taking: Reported on 4/7/2025) 60 tablet 0    methylPREDNISolone 4 MG tablet therapy pack Use as directed on package (Patient not taking: Reported on 3/18/2025) 1 each 0    naproxen (Naprosyn) 500 mg tablet Take 1 tablet (500 mg total) by mouth 2 (two) times a day with meals (Patient not taking: Reported on 4/7/2025) 30 tablet 0     No current facility-administered medications on file prior to visit.     Social History     Socioeconomic History    Marital status: /Civil Union     Spouse name: Not on file    Number of children: 3    Years of education: Not on file    Highest education level: Not on file   Occupational History    Not on file   Tobacco Use    Smoking status: Never    Smokeless tobacco: Never   Vaping Use    Vaping status: Never Used   Substance and Sexual Activity    Alcohol use: Not Currently    Drug use: Never    Sexual activity: Not Currently   Other Topics Concern    Not on file   Social History Narrative    Not on file     Social Drivers of Health     Financial Resource Strain: Not on file   Food Insecurity: Not on file   Transportation Needs: Not on file   Physical Activity: Not on file   Stress: Not on file   Social Connections: Not on file   Intimate Partner Violence: Not on file   Housing Stability: Not on file         I have reviewed the past medical, surgical, social and family history, medications and allergies as documented in the EMR.      Physical Exam:    Focused left shoulder exam:  No paracervical tenderness.   No cervical tenderness.   No pain with neck flexion, extension, side-to-side bending, or rotation.   Negative Spurling's bilaterally.    Inspection:  - Skin: intact, no erythema or ecchymosis, no open wounds  - Atrophy of supraspinatus  or infraspinatus: no  - Scapular winging: no  - Scapular positioning: normal    Tenderness to Palpation:  - SC joint: no  - Clavicle: no  - Lateral aspect of acromion: no  - Posterior joint line: no  - AC joint: no  - Bicipital groove: no    Shoulder ROM:  - Passive forward flexion: 70 degrees  - Active forward flexion: 70 degrees  - Passive external rotation: 10 degrees  - Active external rotation: 10 degrees  - Internal rotation to: unable to assess due to limited ROM  - Passive internal rotation with 40 degrees abduction: 5 degrees  - Active internal rotation with 40 degrees abduction: 5 degrees    Strength testing:  - Empty can: 4+/5  - Resisted external rotation: 5/5  - Resisted internal rotation: 5/5  - Belly press: negative  - Bear hug test: n/a  - Hornblower: n/a  - External rotation lag sign: negative    Impingement:  - Hawkin's impingement test: negative  - Neer impingement sign: negative    Biceps testing:  - Yeagerson: negative  - Speeds: negative    UE NV Exam:   +2 Radial pulses bilaterally.   Fingers are warm and well-perfused.  SILT C5-T1, SILT median, ulnar, radial nerve distributions  Radial/median/ulnar/PIN/AIN motor intact      Shoulder Imaging    Radiographs of the left shoulder were obtained on 1/17/2025 and reviewed with the patient.  Based on my independent evaluation, the imaging shows No evidence of fracture, dislocation, or significant degenerative disease.      Procedures:  None      This note was written with the use of dictation software. Please excuse any errors.      Scribe Attestation      I,:  Akin Jimenez PA-C am acting as a scribe while in the presence of the attending physician.:       I,:  Jasen Israel MD personally performed the services described in this documentation    as scribed in my presence.:            independent

## 2025-04-07 NOTE — PROGRESS NOTES
Daily Note     Today's date: 2025  Patient name: Rosario Green  : 1975  MRN: [id number]  Referring provider: Camila Ashby MD  Dx:   Encounter Diagnosis     ICD-10-CM    1. Chronic left shoulder pain  M25.512     G89.29       2. Bursitis of left shoulder  M75.52       3. Cervicalgia  M54.2       4. Myofascial pain syndrome  M79.18           Start Time: 1450          Subjective: Patient states she is just uncomfortable in the left shoulder. No real pain. She is starting to reach for more things with the arm. She sees the MD after today's treatment.      Objective: See treatment diary below    Patient's home exercise program updated to include additional exercises. Handout declined, but exercises accepted. Program modified due to time restraint because of MD visit.     Assessment: Tolerated treatment well. Patient would benefit from continued PT for stretching and strengthening. She was able to add exercises to her program with little difficulty. Patient seemed to understand all education on new exercises. She continues with limited left arm ROM. Patinet guards during ROM. She felt good leaving department.      Plan: Continue per plan of care.  Progress treatment as tolerated.       Precautions: none noted   Access code: [email or web address]  Progress note:   POC:     Manuals 4/7 3/12 3/18- HEP review 3/24 3/31   stretching L shoulder PROM L shoulder stretch performed  L shoulder PROM L shoulder PROM   SOR 1min x2 1 min x2  1 min x2 1 min x2   Cervical traction :15x6 :15x6  :15x6 :15x6   C/S PA and side glide mobs        T/S PA mobs  Muscle energy      Scapular PNF        IASTM        Neuro Re-Ed        UBE L2 x6 min stand 90/70 x6 min ALT stand 90/70 x6 min ALT stand 90/70 x6min ALT stand L1 x6 min   scap retraction        Shoulder rolls        Chin tucks x10 x10  x10 x10   Chin tuck neck ext x10 x10  x10 x10   Supine chin tuck         MTP/LTP  Ed x15 ea      SNAG L Used RUE to turn L 10x Used RUE to  "turn L 10x  Used RUE to turn L 10x Used RUE to turn L 10x   Ther Ex        UT stretch :10 x5 B/L O.P.   5x10\" B/L :10 x5 B/L O.P.   LS stretch        pendulums        STM stretch :10x5       C/S AROM        Serratus punch        Prone row  x15      Prone I, T's thumbs up and down NV X15 ea all  X15 ea all X15 ea all   Standing YTI I,Y,T x10 ea I,Y,T x10 ea  I,Y,T x10 ea I,Y,T x10 ea   Lat pull down        Doorway ER        No money x20 x10  x10 x15   Cane single shld ext  X10 tired      LT lift off Standing        Table slide flex, scap X10 ea higher, ER x10 X10 ea higher, ER x10  X10 ea higher, ER x10 X10 ea higher, ER x10   Necks level Yellow x20 ea Yellow x20 ea  nv Yellow x20 ea   Thoracic ext 1/2 foam x10 hands on shoulders 1/2 foam x10 hands on shoulders  1/2 foam x10 hands on shoulders 1/2 foam x10 hands on shoulders   Posture correction        Open book        Ther Activity                        Gait Training                        Modalities                            Access Code: [email or web address]  URL: [email or web address]  Date: 04/07/2025  Prepared by: Nicole Mason    Exercises  - Seated Scapular Retraction  - 3 x daily - 7 x weekly - 1 sets - 10 reps  - Seated Cervical Retraction  - 5 x daily - 7 x weekly - 1 sets - 10 reps - 3 sec hold  - Seated Cervical Retraction and Extension  - 3 x daily - 7 x weekly - 1 sets - 10 reps  - Seated Assisted Cervical Rotation with Towel  - 2 x daily - 7 x weekly - 1 sets - 10 reps  - Circular Shoulder Pendulum with Table Support  - 3 x daily - 7 x weekly - 3 sets - 10 reps  - Flexion-Extension Shoulder Pendulum with Table Support  - 3 x daily - 7 x weekly - 3 sets - 10 reps  - Horizontal Shoulder Pendulum with Table Support  - 3 x daily - 7 x weekly - 3 sets - 10 reps  - Standing Backward Shoulder Rolls  - 3 x daily - 7 x weekly - 3 sets - 10 reps  - Seated Upper Trapezius Stretch  - 2 x daily - 7 x weekly - 1 sets - 4 reps -  sec hold  - Seated Shoulder " Flexion Towel Slide at Table Top  - 2 x daily - 7 x weekly - 1 sets - 10 reps - 5-10 sec hold  - Seated Shoulder Scaption Slide at Table Top with Forearm in Neutral  - 2 x daily - 7 x weekly - 1 sets - 10 reps - 5-10 sec hold  - Seated Thoracic Extension AROM  - 2 x daily - 7 x weekly - 1 sets - 10 reps - 3-5 sec hold  - Prone Shoulder Row  - 2 x daily - 7 x weekly - 1 sets - 10 reps  - Prone Shoulder Extension - Single Arm  - 2 x daily - 7 x weekly - 1 sets - 10 reps  - Prone Shoulder Flexion  - 2 x daily - 7 x weekly - 1 sets - 10 reps  - Prone Shoulder Horizontal Abduction  - 2 x daily - 7 x weekly - 1 sets - 10 reps  - Low Trap Setting at Wall  - 2 x daily - 7 x weekly - 1 sets - 10 reps  - Standing Shoulder Row with Anchored Resistance  - 2 x daily - 7 x weekly - 1 sets - 10 reps  - Shoulder extension with resistance - Neutral  - 2 x daily - 7 x weekly - 1 sets - 10 reps  - Correct Seated Posture  - 2 x daily - 7 x weekly - 1 sets - 10 reps - 10 sec hold  - Sternocleidomastoid Stretch  - 2 x daily - 7 x weekly - 1 sets - 3 reps - 30 sec holdAccess Code: [email or web address]

## 2025-04-08 DIAGNOSIS — M75.02 ADHESIVE CAPSULITIS OF LEFT SHOULDER: Primary | ICD-10-CM

## 2025-04-08 RX ORDER — MELOXICAM 7.5 MG/1
7.5 TABLET ORAL DAILY
Qty: 30 TABLET | Refills: 0 | Status: SHIPPED | OUTPATIENT
Start: 2025-04-08

## 2025-04-09 ENCOUNTER — APPOINTMENT (OUTPATIENT)
Dept: PHYSICAL THERAPY | Facility: CLINIC | Age: 50
End: 2025-04-09
Payer: COMMERCIAL

## 2025-04-10 ENCOUNTER — OFFICE VISIT (OUTPATIENT)
Dept: FAMILY MEDICINE CLINIC | Facility: CLINIC | Age: 50
End: 2025-04-10
Payer: COMMERCIAL

## 2025-04-10 VITALS
HEIGHT: 62 IN | BODY MASS INDEX: 20.43 KG/M2 | DIASTOLIC BLOOD PRESSURE: 72 MMHG | RESPIRATION RATE: 18 BRPM | SYSTOLIC BLOOD PRESSURE: 106 MMHG | TEMPERATURE: 98.1 F | HEART RATE: 81 BPM | OXYGEN SATURATION: 98 % | WEIGHT: 111 LBS

## 2025-04-10 DIAGNOSIS — Z00.00 ANNUAL PHYSICAL EXAM: Primary | ICD-10-CM

## 2025-04-10 DIAGNOSIS — Z23 ENCOUNTER FOR IMMUNIZATION: ICD-10-CM

## 2025-04-10 DIAGNOSIS — Q38.5 HIGH ARCHED PALATE: ICD-10-CM

## 2025-04-10 DIAGNOSIS — M24.9 HYPERMOBILITY OF JOINT: ICD-10-CM

## 2025-04-10 DIAGNOSIS — M25.50 ARTHRALGIA, UNSPECIFIED JOINT: ICD-10-CM

## 2025-04-10 PROCEDURE — 99396 PREV VISIT EST AGE 40-64: CPT | Performed by: NURSE PRACTITIONER

## 2025-04-10 PROCEDURE — 99213 OFFICE O/P EST LOW 20 MIN: CPT | Performed by: NURSE PRACTITIONER

## 2025-04-10 PROCEDURE — 90471 IMMUNIZATION ADMIN: CPT

## 2025-04-10 PROCEDURE — 90750 HZV VACC RECOMBINANT IM: CPT

## 2025-04-10 NOTE — PATIENT INSTRUCTIONS
"Patient Education     Routine physical for adults   The Basics   Written by the doctors and editors at [facility]   What is a physical? -- A physical is a routine visit, or \"check-up,\" with your doctor. You might also hear it called a \"wellness visit\" or \"preventive visit.\"  During each visit, the doctor will:   Ask about your physical and mental health   Ask about your habits, behaviors, and lifestyle   Do an exam   Give you vaccines if needed   Talk to you about any medicines you take   Give advice about your health   Answer your questions  Getting regular check-ups is an important part of taking care of your health. It can help your doctor find and treat any problems you have. But it's also important for preventing health problems.  A routine physical is different from a \"sick visit.\" A sick visit is when you see a doctor because of a health concern or problem. Since physicals are scheduled ahead of time, you can think about what you want to ask the doctor.  How often should I get a physical? -- It depends on your age and health. In general, for people age 21 years and older:   If you are younger than 50 years, you might be able to get a physical every 3 years.   If you are 50 years or older, your doctor might recommend a physical every year.  If you have an ongoing health condition, like diabetes or high blood pressure, your doctor will probably want to see you more often.  What happens during a physical? -- In general, each visit will include:   Physical exam - The doctor or nurse will check your height, weight, heart rate, and blood pressure. They will also look at your eyes and ears. They will ask about how you are feeling and whether you have any symptoms that bother you.   Medicines - It's a good idea to bring a list of all the medicines you take to each doctor visit. Your doctor will talk to you about your medicines and answer any questions. Tell them if you are having any side effects that bother you. You " "should also tell them if you are having trouble paying for any of your medicines.   Habits and behaviors - This includes:   Your diet   Your exercise habits   Whether you smoke, drink alcohol, or use drugs   Whether you are sexually active   Whether you feel safe at home  Your doctor will talk to you about things you can do to improve your health and lower your risk of health problems. They will also offer help and support. For example, if you want to quit smoking, they can give you advice and might prescribe medicines. If you want to improve your diet or get more physical activity, they can help you with this, too.   Lab tests, if needed - The tests you get will depend on your age and situation. For example, your doctor might want to check your:   Cholesterol   Blood sugar   Iron level   Vaccines - The recommended vaccines will depend on your age, health, and what vaccines you already had. Vaccines are very important because they can prevent certain serious or deadly infections.   Discussion of screening - \"Screening\" means checking for diseases or other health problems before they cause symptoms. Your doctor can recommend screening based on your age, risk, and preferences. This might include tests to check for:   Cancer, such as breast, prostate, cervical, ovarian, colorectal, prostate, lung, or skin cancer   Sexually transmitted infections, such as chlamydia and gonorrhea   Mental health conditions like depression and anxiety  Your doctor will talk to you about the different types of screening tests. They can help you decide which screenings to have. They can also explain what the results might mean.   Answering questions - The physical is a good time to ask the doctor or nurse questions about your health. If needed, they can refer you to other doctors or specialists, too.  Adults older than 65 years often need other care, too. As you get older, your doctor will talk to you about:   How to prevent falling at " home   Hearing or vision tests   Memory testing   How to take your medicines safely   Making sure that you have the help and support you need at home  All topics are updated as new evidence becomes available and our peer review process is complete.  This topic retrieved from [employer] on: May 02, 2024.  Topic 678189 Version 1.0  Release: 32.4.3 - C32.122  © 2024 UpToDate, Inc. and/or its affiliates. All rights reserved.  Consumer Information Use and Disclaimer   Disclaimer: This generalized information is a limited summary of diagnosis, treatment, and/or medication information. It is not meant to be comprehensive and should be used as a tool to help the user understand and/or assess potential diagnostic and treatment options. It does NOT include all information about conditions, treatments, medications, side effects, or risks that may apply to a specific patient. It is not intended to be medical advice or a substitute for the medical advice, diagnosis, or treatment of a health care provider based on the health care provider's examination and assessment of a patient's specific and unique circumstances. Patients must speak with a health care provider for complete information about their health, medical questions, and treatment options, including any risks or benefits regarding use of medications. This information does not endorse any treatments or medications as safe, effective, or approved for treating a specific patient. UpToDate, Inc. and its affiliates disclaim any warranty or liability relating to this information or the use thereof.The use of this information is governed by the Terms of Use, available at https://www.woltersFactonomyuwer.com/en/know/clinical-effectiveness-terms. 2024© UpToDate, Inc. and its affiliates and/or licensors. All rights reserved.  Copyright   © 2024 UpToDate, Inc. and/or its affiliates. All rights reserved.

## 2025-04-10 NOTE — PROGRESS NOTES
Adult Annual Physical  Name: Rosario Green      : 1975      MRN: [id number]  Encounter Provider: NALINI Stein  Encounter Date: 4/10/2025   Encounter department: [facility] PR[facility]    :  Assessment & Plan  Annual physical exam    Orders:  •  Comprehensive metabolic panel; Future  •  CBC and differential; Future  •  Lipid panel; Future  •  TSH, 3rd generation with Free T4 reflex; Future  •  Ambulatory Referral to Rheumatology; Future    Arthralgia, unspecified joint    Orders:  •  Ambulatory Referral to Rheumatology; Future    Hypermobility of joint    Orders:  •  Ambulatory Referral to Rheumatology; Future    High arched palate    Orders:  •  Ambulatory Referral to Rheumatology; Future    Encounter for immunization    Orders:  •  Zoster Vaccine Recombinant IM        Preventive Screenings:  - Diabetes Screening: orders placed and risks/benefits discussed  - Cholesterol Screening: orders placed and risks/benefits discussed   - Hepatitis C screening: screening not indicated   - HIV screening: screening not indicated   - Cervical cancer screening: screening up-to-date   - Breast cancer screening: screening up-to-date   - Colon cancer screening: screening up-to-date   - Lung cancer screening: screening not indicated     Immunizations:  - Immunizations due: Influenza    Counseling/Anticipatory Guidance:  - Alcohol: discussed moderation in alcohol intake and recommendations for healthy alcohol use.   - Drug use: discussed harms of illicit drug use and how it can negatively impact mental/physical health.   - Tobacco use: discussed harms of tobacco use and management options for quitting.   - Dental health: discussed importance of regular tooth brushing, flossing, and dental visits.   - Sexual health: discussed sexually transmitted diseases, partner selection, use of condoms, avoidance of unintended pregnancy, and contraceptive alternatives.   - Diet: discussed recommendations for a  "healthy/well-balanced diet.   - Exercise: the importance of regular exercise/physical activity was discussed. Recommend exercise 3-5 times per week for at least 30 minutes.   - Injury prevention: discussed safety/seat belts, safety helmets, smoke detectors, carbon monoxide detectors, and smoking near bedding or upholstery.          History of Present Illness     Adult Annual Physical:  Patient presents for annual physical. Patient here for wellness visit. Concerned with Ehrlos danlos syndrome due to multiple symptoms she has noticed over time like joint pain, hyper mobility. .     Diet and Physical Activity:  - Diet/Nutrition: no special diet.  - Exercise: walking and 3-4 times a week on average. yoga.    General Health:  - Sleep: sleeps well and 4-6 hours of sleep on average.  - Hearing: normal hearing bilateral ears.  - Vision: most recent eye exam < 1 year ago, wears glasses, wears contacts and vision problems.  - Dental: regular dental visits and brushes teeth twice daily.    /GYN Health:  - Follows with GYN: yes.   - Menopause: postmenopausal.   - Contraception: menopause.      Review of Systems   Constitutional: Negative.    Respiratory:  Negative for shortness of breath.    Cardiovascular: Negative.  Negative for chest pain and palpitations.   Musculoskeletal:  Positive for arthralgias and myalgias.   Neurological: Negative.  Negative for dizziness, light-headedness and headaches.   Psychiatric/Behavioral:  Positive for sleep disturbance. Negative for dysphoric mood. The patient is not nervous/anxious.          Objective   /72   Pulse 81   Temp 98.1 °F (36.7 °C)   Resp 18   Ht 5' 2\" (1.575 m)   Wt 50.3 kg (111 lb)   LMP 10/11/2023 (Exact Date)   SpO2 98%   BMI 20.30 kg/m²     Physical Exam  Vitals and nursing note reviewed.   Constitutional:       General: She is not in acute distress.     Appearance: Normal appearance. She is well-developed. She is not diaphoretic.   HENT:      Head: " Normocephalic and atraumatic.      Right Ear: Tympanic membrane and ear canal normal.      Left Ear: Tympanic membrane and ear canal normal.      Nose: Nose normal. No congestion.      Mouth/Throat:      Mouth: Mucous membranes are moist.      Pharynx: No oropharyngeal exudate.   Cardiovascular:      Rate and Rhythm: Normal rate and regular rhythm.   Pulmonary:      Effort: Pulmonary effort is normal. No tachypnea or respiratory distress.      Breath sounds: Normal breath sounds.   Skin:     General: Skin is warm.   Neurological:      General: No focal deficit present.      Mental Status: She is alert and oriented to person, place, and time.   Psychiatric:         Speech: Speech normal.         Behavior: Behavior normal. Behavior is cooperative.         Thought Content: Thought content normal.         Judgment: Judgment normal.

## 2025-04-14 ENCOUNTER — APPOINTMENT (OUTPATIENT)
Dept: PHYSICAL THERAPY | Facility: CLINIC | Age: 50
End: 2025-04-14
Payer: COMMERCIAL

## 2025-04-15 ENCOUNTER — APPOINTMENT (OUTPATIENT)
Dept: LAB | Facility: CLINIC | Age: 50
End: 2025-04-15
Attending: NURSE PRACTITIONER
Payer: COMMERCIAL

## 2025-04-15 ENCOUNTER — OFFICE VISIT (OUTPATIENT)
Dept: OBGYN CLINIC | Facility: CLINIC | Age: 50
End: 2025-04-15
Payer: COMMERCIAL

## 2025-04-15 VITALS
HEART RATE: 98 BPM | WEIGHT: 112 LBS | BODY MASS INDEX: 20.61 KG/M2 | TEMPERATURE: 99.2 F | HEIGHT: 62 IN | OXYGEN SATURATION: 99 %

## 2025-04-15 DIAGNOSIS — M75.02 ADHESIVE CAPSULITIS OF LEFT SHOULDER: Primary | ICD-10-CM

## 2025-04-15 DIAGNOSIS — Z00.00 ANNUAL PHYSICAL EXAM: ICD-10-CM

## 2025-04-15 LAB
BASOPHILS # BLD AUTO: 0.04 THOUSANDS/ÂΜL (ref 0–0.1)
BASOPHILS NFR BLD AUTO: 1 % (ref 0–1)
EOSINOPHIL # BLD AUTO: 0.07 THOUSAND/ÂΜL (ref 0–0.61)
EOSINOPHIL NFR BLD AUTO: 2 % (ref 0–6)
ERYTHROCYTE [DISTWIDTH] IN BLOOD BY AUTOMATED COUNT: 11.9 % (ref 11.6–15.1)
HCT VFR BLD AUTO: 38.5 % (ref 34.8–46.1)
HGB BLD-MCNC: 13 G/DL (ref 11.5–15.4)
IMM GRANULOCYTES # BLD AUTO: 0.01 THOUSAND/UL (ref 0–0.2)
IMM GRANULOCYTES NFR BLD AUTO: 0 % (ref 0–2)
LYMPHOCYTES # BLD AUTO: 1.23 THOUSANDS/ÂΜL (ref 0.6–4.47)
LYMPHOCYTES NFR BLD AUTO: 35 % (ref 14–44)
MCH RBC QN AUTO: 31.5 PG (ref 26.8–34.3)
MCHC RBC AUTO-ENTMCNC: 33.8 G/DL (ref 31.4–37.4)
MCV RBC AUTO: 93 FL (ref 82–98)
MONOCYTES # BLD AUTO: 0.33 THOUSAND/ÂΜL (ref 0.17–1.22)
MONOCYTES NFR BLD AUTO: 10 % (ref 4–12)
NEUTROPHILS # BLD AUTO: 1.79 THOUSANDS/ÂΜL (ref 1.85–7.62)
NEUTS SEG NFR BLD AUTO: 52 % (ref 43–75)
NRBC BLD AUTO-RTO: 0 /100 WBCS
PLATELET # BLD AUTO: 253 THOUSANDS/UL (ref 149–390)
PMV BLD AUTO: 11.7 FL (ref 8.9–12.7)
RBC # BLD AUTO: 4.13 MILLION/UL (ref 3.81–5.12)
WBC # BLD AUTO: 3.47 THOUSAND/UL (ref 4.31–10.16)

## 2025-04-15 PROCEDURE — 36415 COLL VENOUS BLD VENIPUNCTURE: CPT

## 2025-04-15 PROCEDURE — 85025 COMPLETE CBC W/AUTO DIFF WBC: CPT

## 2025-04-15 PROCEDURE — 80053 COMPREHEN METABOLIC PANEL: CPT

## 2025-04-15 PROCEDURE — 99244 OFF/OP CNSLTJ NEW/EST MOD 40: CPT | Performed by: FAMILY MEDICINE

## 2025-04-15 PROCEDURE — 84443 ASSAY THYROID STIM HORMONE: CPT

## 2025-04-15 PROCEDURE — 80061 LIPID PANEL: CPT

## 2025-04-15 PROCEDURE — 20611 DRAIN/INJ JOINT/BURSA W/US: CPT | Performed by: FAMILY MEDICINE

## 2025-04-15 RX ORDER — BUPIVACAINE HYDROCHLORIDE 5 MG/ML
3.5 INJECTION, SOLUTION PERINEURAL
Status: COMPLETED | OUTPATIENT
Start: 2025-04-15 | End: 2025-04-15

## 2025-04-15 RX ORDER — TRIAMCINOLONE ACETONIDE 40 MG/ML
40 INJECTION, SUSPENSION INTRA-ARTICULAR; INTRAMUSCULAR
Status: COMPLETED | OUTPATIENT
Start: 2025-04-15 | End: 2025-04-15

## 2025-04-15 RX ADMIN — BUPIVACAINE HYDROCHLORIDE 3.5 ML: 5 INJECTION, SOLUTION PERINEURAL at 13:30

## 2025-04-15 RX ADMIN — TRIAMCINOLONE ACETONIDE 40 MG: 40 INJECTION, SUSPENSION INTRA-ARTICULAR; INTRAMUSCULAR at 13:30

## 2025-04-15 NOTE — PATIENT INSTRUCTIONS
F/u with Dr. Israel  Begin physical therapy  US guided L shoulder GH joint steroid injection given  Icing/heat/OTC pain meds as needed.

## 2025-04-15 NOTE — PROGRESS NOTES
"[facility] [address] [place]  [facility] [id number] [phone]      Assessment:  1. Adhesive capsulitis of left shoulder  -     Ambulatory Referral to Orthopedic Surgery  -     Large joint arthrocentesis: L glenohumeral    Assessment & Plan  Adhesive capsulitis of left shoulder    Orders:    Ambulatory Referral to Orthopedic Surgery    Large joint arthrocentesis: L glenohumeral      Patient Instructions   F/u with Dr. Israel  Begin physical therapy  US guided L shoulder GH joint steroid injection given  Icing/heat/OTC pain meds as needed.    Plan:  Patient Instructions   F/u with Dr. Israel  Begin physical therapy  US guided L shoulder GH joint steroid injection given  Icing/heat/OTC pain meds as needed.     Return if symptoms worsen or fail to improve.    Chief Complaint:  Chief Complaint   Patient presents with    Left Shoulder - Follow-up     USGI L shoulder        Subjective:   HPI    Patient ID: Rosario Green is a 50 y.o. female     I was asked by Dr. Israel to evaluate Ms. Green for an US guided L shoulder GH joint steroid injection  She has a hx of adhesive capsulitis for about 2 months  Dec ROM   Pain with movement  Taking meloxicam- not helping much      Review of Systems   Constitutional:  Negative for fatigue and fever.   Respiratory:  Negative for shortness of breath.    Cardiovascular:  Negative for chest pain.   Gastrointestinal:  Negative for abdominal pain and nausea.   Genitourinary:  Negative for dysuria.   Musculoskeletal:  Positive for arthralgias.   Skin:  Negative for rash and wound.   Neurological:  Negative for weakness and headaches.       Objective:  Vitals:  Pulse 98   Temp 99.2 °F (37.3 °C) (Tympanic)   Ht 5' 2\" (1.575 m)   Wt 50.8 kg (112 lb)   LMP 10/11/2023 (Exact Date)   SpO2 99%   BMI 20.49 kg/m²     The following portions of the patient's history were reviewed and updated as appropriate: allergies, current " "medications, past family history, past medical history, past social history, past surgical history, and problem list.    Physical exam:  Physical Exam  Constitutional:       Appearance: Normal appearance. She is normal weight.   HENT:      Head: Normocephalic.   Eyes:      Extraocular Movements: Extraocular movements intact.   Pulmonary:      Effort: Pulmonary effort is normal.   Musculoskeletal:      Cervical back: Normal range of motion.   Skin:     General: Skin is warm and dry.   Neurological:      General: No focal deficit present.      Mental Status: She is alert and oriented to person, place, and time. Mental status is at baseline.   Psychiatric:         Mood and Affect: Mood normal.         Behavior: Behavior normal.         Thought Content: Thought content normal.         Judgment: Judgment normal.       Left Shoulder Exam     Tenderness   The patient is experiencing no tenderness.     Range of Motion   Active abduction:  50 abnormal   Passive abduction:  abnormal   Extension:  normal   External rotation:  normal   Forward flexion:  abnormal   Internal rotation 0 degrees:  normal   Internal rotation 90 degrees:  normal              I have personally reviewed pertinent films in PACS and my interpretation is XR L shoulder- nml study.no fx.    Large joint arthrocentesis: L glenohumeral  Universal Protocol:  procedure performed by consultantConsent: Verbal consent obtained.  Risks and benefits: risks, benefits and alternatives were discussed  Consent given by: patient  Time out: Immediately prior to procedure a \"time out\" was called to verify the correct patient, procedure, equipment, support staff and site/side marked as required.  Timeout called at: 4/15/2025 1:21 PM.  Site marked: the operative site was marked  Supporting Documentation  Indications: pain   Procedure Details  Location: shoulder - L glenohumeral  Preparation: Patient was prepped and draped in the usual sterile fashion  Needle size: 25 " G  Ultrasound guidance: yes  Approach: posterolateral  Medications administered: 40 mg triamcinolone acetonide 40 mg/mL; 3.5 mL bupivacaine 0.5 %    Patient tolerance: patient tolerated the procedure well with no immediate complications  Dressing:  Sterile dressing applied      \    Ted Pascual MD

## 2025-04-16 ENCOUNTER — APPOINTMENT (OUTPATIENT)
Dept: PHYSICAL THERAPY | Facility: CLINIC | Age: 50
End: 2025-04-16
Payer: COMMERCIAL

## 2025-04-16 LAB
ALBUMIN SERPL BCG-MCNC: 4.8 G/DL (ref 3.5–5)
ALP SERPL-CCNC: 53 U/L (ref 34–104)
ALT SERPL W P-5'-P-CCNC: 15 U/L (ref 7–52)
ANION GAP SERPL CALCULATED.3IONS-SCNC: 9 MMOL/L (ref 4–13)
AST SERPL W P-5'-P-CCNC: 18 U/L (ref 13–39)
BILIRUB SERPL-MCNC: 1.03 MG/DL (ref 0.2–1)
BUN SERPL-MCNC: 15 MG/DL (ref 5–25)
CALCIUM SERPL-MCNC: 9.9 MG/DL (ref 8.4–10.2)
CHLORIDE SERPL-SCNC: 102 MMOL/L (ref 96–108)
CHOLEST SERPL-MCNC: 195 MG/DL (ref ?–200)
CO2 SERPL-SCNC: 29 MMOL/L (ref 21–32)
CREAT SERPL-MCNC: 0.64 MG/DL (ref 0.6–1.3)
GFR SERPL CREATININE-BSD FRML MDRD: 104 ML/MIN/1.73SQ M
GLUCOSE P FAST SERPL-MCNC: 91 MG/DL (ref 65–99)
HDLC SERPL-MCNC: 73 MG/DL
LDLC SERPL CALC-MCNC: 111 MG/DL (ref 0–100)
NONHDLC SERPL-MCNC: 122 MG/DL
POTASSIUM SERPL-SCNC: 3.8 MMOL/L (ref 3.5–5.3)
PROT SERPL-MCNC: 7.2 G/DL (ref 6.4–8.4)
SODIUM SERPL-SCNC: 140 MMOL/L (ref 135–147)
TRIGL SERPL-MCNC: 57 MG/DL (ref ?–150)
TSH SERPL DL<=0.05 MIU/L-ACNC: 0.73 UIU/ML (ref 0.45–4.5)

## 2025-04-24 ENCOUNTER — EVALUATION (OUTPATIENT)
Dept: PHYSICAL THERAPY | Facility: CLINIC | Age: 50
End: 2025-04-24
Payer: COMMERCIAL

## 2025-04-24 DIAGNOSIS — M79.18 MYOFASCIAL PAIN SYNDROME: ICD-10-CM

## 2025-04-24 DIAGNOSIS — G89.29 CHRONIC LEFT SHOULDER PAIN: Primary | ICD-10-CM

## 2025-04-24 DIAGNOSIS — M54.2 CERVICALGIA: ICD-10-CM

## 2025-04-24 DIAGNOSIS — M25.512 CHRONIC LEFT SHOULDER PAIN: Primary | ICD-10-CM

## 2025-04-24 DIAGNOSIS — M75.52 BURSITIS OF LEFT SHOULDER: ICD-10-CM

## 2025-04-24 PROCEDURE — 97164 PT RE-EVAL EST PLAN CARE: CPT | Performed by: PHYSICAL THERAPIST

## 2025-04-24 PROCEDURE — 97140 MANUAL THERAPY 1/> REGIONS: CPT | Performed by: PHYSICAL THERAPIST

## 2025-04-24 PROCEDURE — 97110 THERAPEUTIC EXERCISES: CPT | Performed by: PHYSICAL THERAPIST

## 2025-04-24 NOTE — PROGRESS NOTES
PT Re-Evaluation     Today's date: 2025  Patient name: Rosario Green  : 1975  MRN: [id number]  Referring provider: Camila Ashby MD  Dx:   Encounter Diagnosis     ICD-10-CM    1. Chronic left shoulder pain  M25.512     G89.29       2. Bursitis of left shoulder  M75.52       3. Cervicalgia  M54.2       4. Myofascial pain syndrome  M79.18           Start Time: 0815  Stop Time: 09  Total time in clinic (min): 45 minutes    Assessment  Impairments: abnormal muscle tone, abnormal or restricted ROM, activity intolerance, impaired physical strength, lacks appropriate home exercise program, pain with function, scapular dyskinesis, poor posture  and poor body mechanics  Functional limitations: lifitng, twisting, carrying, reaching    Assessment details:   Rosraio Green was seen for an initial PT evaluation and 11 f/u sessions. Patient is a 49 y.o. female with diagnosis of cervical radiculopathy and shoulder pain and past medical history significant for lupus, hyperparathyroidism with parathyroidectomy, anemia. FOTO functional score shows improvement from 47% at intake to 73% today surpassing predicted value of 66%. Findings of examination today shows continued improvement with left shoulder strength and range of motion with decreased pain and improved functional mobility. Most noted deficits with tightness into cross body adduction, abduction and internal rotation that were addressed in HEP today. Will change focus from cervical to shoulder restriction during sessions, but should continue posture and cervical exercises independently. Will continue 1x/week for an additional 4 weeks until next re-evaluation.        Goals  STG (6 weeks)  1. Patient will display good seated posture with decreased forward head and retracted shoulders for 2 consecutive sessions with 50% VC. - MET 3/18  2. Patient will have 0/10 cervical pain at rest. - MET   3. Improve cervical rotation by 25% for improved ability to  drive. - MET 3/18   LTG (12 weeks)  1. Improved FOTO score by 15% for improved improved overall function. -MET 4/24  2. Patient will be able to reach overhead with 0/10 pain for return to PLOF. - PROGRESSING  3. Patient will be independent with home exercise program for continued maintenance post PT DC.-PROGRESSING      Plan  Patient would benefit from: skilled physical therapy  Planned modality interventions: unattended electrical stimulation, thermotherapy: hydrocollator packs and cryotherapy    Planned therapy interventions: manual therapy, neuromuscular re-education, therapeutic activities, therapeutic exercise, self care and home exercise program    Frequency: 2x week  Duration in weeks: 12  Plan of Care beginning date: 2/18/2025  Plan of Care expiration date: 5/18/2025  Treatment plan discussed with: patient and PTA  Plan details: Progress note in 4 weeks.         Subjective Evaluation    History of Present Illness  Date of onset: 12/18/2024  Subjective 4/24: Patient states 70% improvement since th start of PT. Pain has reduced, will only occur now at end ranges of shoulder motion. Cortisone injection about 1-2 weeks ago that has given her a little more external shoulder range of motion. F/u with ortho in 1 month. Difficulty reaching out to the side/back and across body.     Subjective 3/18: Patient states 50% improvement since the start of PT. Still feels like range of motion is very limited and if she pushes it will provoke pain although does not linger any more. Shoulder/arm feels weak. Has had significant reduction in pain intensity and occurrence. Increased ease with reaching behind back (tying apron, bra, braid). Saw pain management today who reviewed MRI as noted below. Will hold on injections, and re-filled gabapentin Rx. F/u again in 2 months. Also referred to ortho for shoulder, patient has that appt early April.     MRI IMPRESSION:     Reversal of the cervical lordosis and mild cervical spondylosis  with mild spinal stenosis at C6-C7.     Variable degrees of foraminal stenosis, most notably on the right at C4-C5.    Mechanism of injury: Rosario Green is a 49 y.o. female who presents to outpatient Physical Therapy today with complaints of cervical and L shoulder pain. Chronic issues with neck, but about 2 months ago began having more pain with difficulty moving neck that radiated into LUE and is now having difficulty with left shoulder reaching, lifting and quick movements.     Patient Goals  Patient goals for therapy: decreased pain  Patient goal: Improve L shoulder range of motion, be able to reach overhead or reach behind back  Pain    3/18 4/24  Current pain ratin  1 0  At best pain rating: 3  1 0  At worst pain rating: 10 4 3-4  Location: L sided neck pain into L proximal arm  Quality: burning  Relieving factors: medications, ice and heat  Exacerbated by: lifting, jerky motions, reaching behind back, sleeping on R side.    Social Support    Employment status: working (/waitressing; going to school)  Hand dominance: right          Objective     Concurrent Complaints  Negative for dizziness, headaches, tinnitus, trouble swallowing, difficulty breathing and visual change    Postural Observations    Additional Postural Observation Details  Upper crossed    Tenderness     Left Shoulder   No tenderness in the subacromial bursa and supraspinatus tendon.     Neurological Testing     Sensation   Cervical/Thoracic   Left   Intact: light touch    Right   Intact: light touch    Comments   Left light touch: C5.   318: equal bilaterally to light touch    Reflexes   Left   Canas's reflex: negative    Right   Canas's reflex: negative    Active Range of Motion   Cervical/Thoracic Spine       Cervical      3/18  4/24    Subcranial retraction:   Restriction level: minimal Min  WNL  Flexion: 80 degrees      70  70  Extension: 50 degrees     60  75  Left lateral flexion: 50 degrees    50  50  Right lateral  flexion: 50 degrees    50  45  Left rotation: 40 degrees    70  70  Right rotation: 70 degrees     70  70    Left Shoulder   Flexion: 65 degrees with pain    105 pain 135  Extension: 35 degrees with pain   35 pain 45  Abduction: 35 degrees with pain   85 pain 115  External rotation 0°: 30 degrees with pain  40 pain @45:50  Internal rotation 0°: 20 degrees with pain  30 pain @45:30    Right Shoulder   Flexion: 170 degrees   Extension: 65 degrees   Abduction: 170 degrees   External rotation BTH: T4   Internal rotation BTB: T4     Passive Range of Motion   3/18   4/24  Left Shoulder   Flexion: 90 degrees    120   140  Abduction: 45 degrees   85   90  External rotation 0°: 30 degrees  @0=40, @45=30 @45=50  Internal rotation 0°: 30 degrees  @0=40   @45=40  Mechanical Assessment    Cervical    Seated retraction: repeated movements   Pain location: no change  Re-tested with L shoulder flexion  Seated Flexion:  repeated movements  Pain location: no change  Seated Extension: repeated movements  Pain location: no change    Thoracic      Lumbar      Strength/Myotome Testing     Left Shoulder    3/18 4/24    Planes of Motion   Flexion: 2+    4- 5  Abduction: 2+    2+ 4-  External rotation at 0°: 2+  4 5  Internal rotation at 0°: 3-  5    Isolated Muscles   Biceps: 4    5  Triceps: 3-    5    Right Shoulder     Planes of Motion   Flexion: 5   Abduction: 5   External rotation at 0°: 5   Internal rotation at 0°: 5     Isolated Muscles   Biceps: 5   Triceps: 5     Left Wrist/Hand   Wrist extension: 5  Wrist flexion: 5  Thumb extension: 5    Right Wrist/Hand   Wrist extension: 5  Wrist flexion: 5  Thumb extension: 5    Tests   Cervical   Negative vertical compression, alar ligament test, Sharp-Adrienne test and VBI.     Left   Negative Spurling's Test B.     Right   Negative Spurling's Test B.     Left Shoulder   Positive Hawkin's.   Negative ULTT1, ULTT3 and ULTT4.   4/24: (-) hawkin's    Lumbar   Negative vertical compression.  "    Additional Tests Details  Nguyen positive for pain, located at proximal arm not shoulder.   3/18: Same as IE        General Comments:      Cervical/Thoracic Comments    FOTO: 47% function, 66% predicted function   3/18: 47%  4/24: 73%    Neuro Exam:     Headaches   Patient reports headaches: No.                  Precautions: none noted   Access code: [email or web address]  Progress note: 5/18  POC: 5/18    Manuals 4/7 4/24 3/18- HEP review 3/24 3/31   stretching L shoulder PROM L shoulder  L shoulder PROM L shoulder PROM   SOR 1min x2 prn  1 min x2 1 min x2   Cervical traction :15x6 prn  :15x6 :15x6   C/S PA and side glide mobs  prn      T/S PA mobs  Scapular mobs NV*      Scapular PNF        IASTM        Neuro Re-Ed        UBE L2 x6 min stand 90/70 6 min alt standing 90/70 x6 min ALT stand 90/70 x6min ALT stand L1 x6 min   scap retraction        Shoulder rolls        Chin tucks x10 prn  x10 x10   Chin tuck neck ext x10 *  x10 x10   Supine chin tuck         MTP/LTP        SNAG L Used RUE to turn L 10x *  Used RUE to turn L 10x Used RUE to turn L 10x   Ther Ex        UT stretch :10 x5 B/L O.P. prn  5x10\" B/L :10 x5 B/L O.P.   LS stretch        pendulums        STM stretch :10x5 prn      C/S AROM        Serratus punch        Prone row        Prone I, T's thumbs up and down NV   X15 ea all X15 ea all   Standing YTI I,Y,T x10 ea *  I,Y,T x10 ea I,Y,T x10 ea   Lat pull down        Sleeper stretch  :10x5      Doorway ER  *      No money x20 20x  x10 x15   Cane single shld ext  ?      Wall walk  Flex, abd :05x5 with ecc lower      Table slide flex, scap X10 ea higher, ER x10 *  X10 ea higher, ER x10 X10 ea higher, ER x10   Necks level Yellow x20 ea   nv Yellow x20 ea   Thoracic ext 1/2 foam x10 hands on shoulders *  1/2 foam x10 hands on shoulders 1/2 foam x10 hands on shoulders   Post shoulder stretch  *              Ther Activity                        Gait Training                        Modalities                      "       Access Code: [email or web address]  URL: [email or web address]  Date: 04/07/2025  Prepared by: Nicole Mason    Exercises  - Seated Scapular Retraction  - 3 x daily - 7 x weekly - 1 sets - 10 reps  - Seated Cervical Retraction  - 5 x daily - 7 x weekly - 1 sets - 10 reps - 3 sec hold  - Seated Cervical Retraction and Extension  - 3 x daily - 7 x weekly - 1 sets - 10 reps  - Seated Assisted Cervical Rotation with Towel  - 2 x daily - 7 x weekly - 1 sets - 10 reps  - Circular Shoulder Pendulum with Table Support  - 3 x daily - 7 x weekly - 3 sets - 10 reps  - Flexion-Extension Shoulder Pendulum with Table Support  - 3 x daily - 7 x weekly - 3 sets - 10 reps  - Horizontal Shoulder Pendulum with Table Support  - 3 x daily - 7 x weekly - 3 sets - 10 reps  - Standing Backward Shoulder Rolls  - 3 x daily - 7 x weekly - 3 sets - 10 reps  - Seated Upper Trapezius Stretch  - 2 x daily - 7 x weekly - 1 sets - 4 reps -  sec hold  - Seated Shoulder Flexion Towel Slide at Table Top  - 2 x daily - 7 x weekly - 1 sets - 10 reps - 5-10 sec hold  - Seated Shoulder Scaption Slide at Table Top with Forearm in Neutral  - 2 x daily - 7 x weekly - 1 sets - 10 reps - 5-10 sec hold  - Seated Thoracic Extension AROM  - 2 x daily - 7 x weekly - 1 sets - 10 reps - 3-5 sec hold  - Prone Shoulder Row  - 2 x daily - 7 x weekly - 1 sets - 10 reps  - Prone Shoulder Extension - Single Arm  - 2 x daily - 7 x weekly - 1 sets - 10 reps  - Prone Shoulder Flexion  - 2 x daily - 7 x weekly - 1 sets - 10 reps  - Prone Shoulder Horizontal Abduction  - 2 x daily - 7 x weekly - 1 sets - 10 reps  - Low Trap Setting at Wall  - 2 x daily - 7 x weekly - 1 sets - 10 reps  - Standing Shoulder Row with Anchored Resistance  - 2 x daily - 7 x weekly - 1 sets - 10 reps  - Shoulder extension with resistance - Neutral  - 2 x daily - 7 x weekly - 1 sets - 10 reps  - Correct Seated Posture  - 2 x daily - 7 x weekly - 1 sets - 10 reps - 10 sec hold  -  Sternocleidomastoid Stretch  - 2 x daily - 7 x weekly - 1 sets - 3 reps - 30 sec holdAccess Code: [email or web address]

## 2025-04-29 ENCOUNTER — RESULTS FOLLOW-UP (OUTPATIENT)
Dept: FAMILY MEDICINE CLINIC | Facility: CLINIC | Age: 50
End: 2025-04-29

## 2025-05-12 ENCOUNTER — OFFICE VISIT (OUTPATIENT)
Dept: RHEUMATOLOGY | Facility: CLINIC | Age: 50
End: 2025-05-12

## 2025-05-12 ENCOUNTER — APPOINTMENT (OUTPATIENT)
Dept: LAB | Facility: CLINIC | Age: 50
End: 2025-05-12
Payer: COMMERCIAL

## 2025-05-12 VITALS
BODY MASS INDEX: 21.35 KG/M2 | SYSTOLIC BLOOD PRESSURE: 96 MMHG | HEIGHT: 62 IN | OXYGEN SATURATION: 97 % | DIASTOLIC BLOOD PRESSURE: 52 MMHG | WEIGHT: 116 LBS | HEART RATE: 79 BPM

## 2025-05-12 DIAGNOSIS — M25.50 ARTHRALGIA, UNSPECIFIED JOINT: ICD-10-CM

## 2025-05-12 DIAGNOSIS — M24.9 HYPERMOBILITY OF JOINT: ICD-10-CM

## 2025-05-12 DIAGNOSIS — Q38.5 HIGH ARCHED PALATE: ICD-10-CM

## 2025-05-12 DIAGNOSIS — Z00.00 ANNUAL PHYSICAL EXAM: ICD-10-CM

## 2025-05-12 DIAGNOSIS — Z00.6 ENCOUNTER FOR EXAMINATION FOR NORMAL COMPARISON OR CONTROL IN CLINICAL RESEARCH PROGRAM: ICD-10-CM

## 2025-05-12 DIAGNOSIS — Q79.60 EDS (EHLERS-DANLOS SYNDROME): Primary | ICD-10-CM

## 2025-05-12 LAB — RHEUMATOID FACT SERPL-ACNC: <10 IU/ML

## 2025-05-12 PROCEDURE — 86039 ANTINUCLEAR ANTIBODIES (ANA): CPT

## 2025-05-12 PROCEDURE — 86431 RHEUMATOID FACTOR QUANT: CPT

## 2025-05-12 PROCEDURE — 86235 NUCLEAR ANTIGEN ANTIBODY: CPT

## 2025-05-12 PROCEDURE — 36415 COLL VENOUS BLD VENIPUNCTURE: CPT

## 2025-05-12 PROCEDURE — 86200 CCP ANTIBODY: CPT

## 2025-05-12 PROCEDURE — 86225 DNA ANTIBODY NATIVE: CPT

## 2025-05-12 PROCEDURE — 86038 ANTINUCLEAR ANTIBODIES: CPT

## 2025-05-12 NOTE — PROGRESS NOTES
Name: Rosario Green      : 1975      MRN: [id number]  Encounter Provider: Marquez Clemons MD  Encounter Date: 2025   Encounter department: [facility] RHEUMATOLOGY [facility]  :  Assessment & Plan  Arthralgia, unspecified joint  This is a 49 y/o female with clinical s/s osteoarthritis with lizz OA hands  Reviewed MRI Cspine (she follows with pain management)  I have reviewed OA and provided handouts  Recommend OTC NSAIDs/Tylenol as needed  Check serologies (known h/o + SILVIA )  RTC as needed  Orders:    Ambulatory Referral to Rheumatology    SILVIA by IFA (No Reflex)for Rheumatologist; Future    Sjogren's Antibodies; Future    Anti-DNA antibody, double-stranded; Future    Anti-scleroderma antibody; Future    Nuclear antigen antibody; Future    RHEUMATOID FACTOR; Future    Cyclic citrul peptide antibody, IgG; Future    Hypermobility of joint  Refer to Genetics   Orders:    Ambulatory Referral to Rheumatology    SILVIA by IFA (No Reflex)for Rheumatologist; Future    Sjogren's Antibodies; Future    Anti-DNA antibody, double-stranded; Future    Anti-scleroderma antibody; Future    Nuclear antigen antibody; Future    RHEUMATOID FACTOR; Future    Cyclic citrul peptide antibody, IgG; Future    EDS (Bruno-Danlos syndrome)  Refer to genetics   Orders:    Ambulatory Referral to Genetics; Future      This is a Rheumatology Consult seen at the request of Margaret GAYLE    History of Present Illness   HPI  Rosario Green is a 50 y.o. female who presents for further evaluation rheumatoid arthritis  History obtained from: patient    She has past medical history shoulder bursitis, hyperthyroidism s/p surgery    She has h/o elevated SILVIA and was seen per rheumatology [place]. Initially felt to have UCTD, tried HCQ and subsequently stopped as no relief    Follows with Orthopedics here at [facility] and had shoulder injections    Chronic arthralgias since her 30s, right foot, right knee and left  shoulder    Occasional swelling    + rash in right hand (this has resolved)    + chronic Raynaud's         Review of Systems  Pertinent Medical History         --------------------------------------------------------------------------------------------------------        ROS:        - for: Fevers, Chills or sweats.  No HAs or scalp tenderness.  No jaw claudication.  No acute visual or eye changes.  No dry eyes.  No auditory complaints.  No oral lesions or ulcers.  No dry mouth.  No sore throat or cough.  No chest pains or palpitations.  No shortness of breath, dyspnea on exertion or wheezing.  No hemotpysis.  No abdominal pain, GERD symptoms, diarrhea or constipation.  No urinary complaints.  No numbness, tingling or weakness.  No rashes.    All other ROS was reviewed and negative except as above         --------------------------------------------------------------------------------------------------------    Past Medical History    Past Medical History:   Diagnosis Date    Hyperparathyroidism (HCC)     Lupus            Past Surgical History    Past Surgical History:   Procedure Laterality Date    BREAST BIOPSY Left 2019    benign    PARATHYROIDECTOMY      2020           Family History    Family History   Problem Relation Age of Onset    Multiple sclerosis Mother     Colon cancer Father     No Known Problems Daughter     No Known Problems Daughter     No Known Problems Maternal Grandmother     No Known Problems Paternal Grandmother     Colon cancer Paternal Aunt     Colon cancer Paternal Aunt             Social History    Social History     Tobacco Use    Smoking status: Never    Smokeless tobacco: Never   Vaping Use    Vaping status: Never Used   Substance Use Topics    Alcohol use: Not Currently    Drug use: Never         Allergies    Allergies   Allergen Reactions    Penicillin G Anaphylaxis    Penicillins Anaphylaxis    Sulfamethoxazole Fever     itching    Ciprofloxacin Itching and Anxiety    Sulfa Antibiotics  Rash         Medications    Current Outpatient Medications   Medication Instructions    gabapentin (NEURONTIN) 300 mg, Oral, 3 times daily    lidocaine (Lidoderm) 5 % 1 patch, Topical, Daily, Remove & Discard patch within 12 hours or as directed by MD    meloxicam (MOBIC) 7.5 mg, Oral, Daily            ________________________________________________________________________          Results Review      Study Result    Narrative & Impression   MRI CERVICAL SPINE WITHOUT CONTRAST     INDICATION: M54.12: Radiculopathy, cervical region.   Neck pain, limited range of motion and pain in the left shoulder and arm.     COMPARISON: X-ray of the cervical spine from December 16, 2024     TECHNIQUE:  Multiplanar, multisequence imaging of the cervical spine was performed. .        IMAGE QUALITY:  Diagnostic     FINDINGS:     ALIGNMENT: Reversal of the cervical lordosis. Trace 1 to 2 mm of posterior subluxation of C6 on C7 is thought to be degenerative in etiology     MARROW SIGNAL: Mixed type I and type II Modic endplate degenerative changes at C6-C7.     CERVICAL AND VISUALIZED THORACIC CORD:  Normal signal within the visualized cord.     PREVERTEBRAL AND PARASPINAL SOFT TISSUES:  Normal.     VISUALIZED POSTERIOR FOSSA:  The visualized posterior fossa demonstrates no abnormal signal.     CERVICAL DISC SPACES:     C2-C3:  Normal.     C3-C4: Bilateral uncovertebral hypertrophy with mild left foraminal stenosis.     C4-C5: Right foraminal herniation with moderate right foraminal stenosis. Mild endplate osteophytic ridging and left uncovertebral hypertrophy with mild left foraminal stenosis. No spinal stenosis.     C5-C6: Right central protrusion, posterior osteophytic ridging and left uncovertebral hypertrophy. No spinal stenosis. Mild left foraminal stenosis.     C6-C7: Posterior disc osteophyte complex with mild bilateral foraminal stenosis. Mild buckling of the ligamentum flavum and mild spinal stenosis.     C7-T1:  Normal.    "  UPPER THORACIC DISC SPACES:  Normal.     OTHER FINDINGS:  None.     IMPRESSION:     Reversal of the cervical lordosis and mild cervical spondylosis with mild spinal stenosis at C6-C7.     Variable degrees of foraminal stenosis, most notably on the right at C4-C5.            Objective   BP 96/52   Pulse 79   Ht 5' 2\" (1.575 m)   Wt 52.6 kg (116 lb)   LMP 10/11/2023 (Exact Date)   SpO2 97%   BMI 21.22 kg/m²      Physical Exam      GEN: AAO, No apparent distress.  Patient is well developed.  HEENT:  Pupils are equal, round and reactive.  Sclera are clear.  Fundoscopic exam is normal.  External ears are without lesions.  Oral pharynx is clear of ulcers or other lesions.  MMM.   NECK:  Supple.  There is no adenopathy appreciable in anterior or posterior cervical chains or supraclavicularly.  JVP is normal.    HEART: Regular rate and rhythm.  There is no appreciable murmur, gallop or rub.  LUNGS: Clear to auscultation.  ABD:  Soft, without tenderness, rebound or guarding.  No appreciable organomegally.  NEURO: Speech and cognition are normal.  Strength is 5/5 throughout.  Tone is normal.  DTRs are 2/4 at the knees, ankles and elbows.  Gait is normal.  SKIN: There are no rashes or lesions    MUSCULOSKELETAL:   No synovitis noted    Thank you for involving me in this patient's care.        Marquez Clemons MD  [facility]          "

## 2025-05-14 LAB
CCP AB SER IA-ACNC: 1 (ref ?–10)
DSDNA IGG SERPL IA-ACNC: <0.9 IU/ML (ref ?–15)
ENA RNP AB SER-ACNC: <0.2 AI (ref 0–0.9)
ENA SCL70 IGG SER IA-ACNC: 0.6 U/ML (ref ?–10)
ENA SM AB SER-ACNC: <0.2 AI (ref 0–0.9)
ENA SS-A AB SER IA-ACNC: <0.5 U/ML (ref ?–10)
ENA SS-B IGG SER IA-ACNC: <0.6 U/ML (ref ?–10)

## 2025-05-21 LAB
ANA PAT SER IF-IMP: ABNORMAL
ANA SER QL IF: POSITIVE
ANA TITR SER HEP2 SUBST: ABNORMAL {TITER}

## 2025-05-22 ENCOUNTER — RESULTS FOLLOW-UP (OUTPATIENT)
Dept: RHEUMATOLOGY | Facility: CLINIC | Age: 50
End: 2025-05-22

## 2025-05-23 LAB
APOB+LDLR+PCSK9 GENE MUT ANL BLD/T: NOT DETECTED
BRCA1+BRCA2 DEL+DUP + FULL MUT ANL BLD/T: NOT DETECTED
MLH1+MSH2+MSH6+PMS2 GN DEL+DUP+FUL M: NOT DETECTED